# Patient Record
Sex: MALE | Race: WHITE | NOT HISPANIC OR LATINO | Employment: OTHER | URBAN - NONMETROPOLITAN AREA
[De-identification: names, ages, dates, MRNs, and addresses within clinical notes are randomized per-mention and may not be internally consistent; named-entity substitution may affect disease eponyms.]

---

## 2019-09-23 PROCEDURE — 93306 TTE W/DOPPLER COMPLETE: CPT | Performed by: INTERNAL MEDICINE

## 2019-09-24 ENCOUNTER — OUTSIDE FACILITY SERVICE (OUTPATIENT)
Dept: CARDIOLOGY | Facility: CLINIC | Age: 69
End: 2019-09-24

## 2019-09-25 ENCOUNTER — APPOINTMENT (OUTPATIENT)
Dept: GENERAL RADIOLOGY | Facility: HOSPITAL | Age: 69
End: 2019-09-25

## 2019-09-25 ENCOUNTER — HOSPITAL ENCOUNTER (OUTPATIENT)
Facility: HOSPITAL | Age: 69
Discharge: HOME | End: 2019-09-26
Attending: EMERGENCY MEDICINE | Admitting: INTERNAL MEDICINE

## 2019-09-25 DIAGNOSIS — K92.1 MELENA: Primary | ICD-10-CM

## 2019-09-25 DIAGNOSIS — I95.1 ORTHOSTASIS: ICD-10-CM

## 2019-09-25 DIAGNOSIS — R51.9 NONINTRACTABLE HEADACHE, UNSPECIFIED CHRONICITY PATTERN, UNSPECIFIED HEADACHE TYPE: ICD-10-CM

## 2019-09-25 DIAGNOSIS — R55 NEAR SYNCOPE: ICD-10-CM

## 2019-09-25 DIAGNOSIS — D64.9 SYMPTOMATIC ANEMIA: ICD-10-CM

## 2019-09-25 LAB
ABO GROUP BLD: NORMAL
ABO GROUP BLD: NORMAL
ALBUMIN SERPL-MCNC: 3.9 G/DL (ref 3.5–5.2)
ALBUMIN/GLOB SERPL: 1.6 G/DL
ALP SERPL-CCNC: 46 U/L (ref 39–117)
ALT SERPL W P-5'-P-CCNC: 17 U/L (ref 1–41)
ANION GAP SERPL CALCULATED.3IONS-SCNC: 9 MMOL/L (ref 5–15)
AST SERPL-CCNC: 19 U/L (ref 1–40)
BASOPHILS # BLD AUTO: 0.05 10*3/MM3 (ref 0–0.2)
BASOPHILS NFR BLD AUTO: 0.6 % (ref 0–1.5)
BILIRUB SERPL-MCNC: 0.4 MG/DL (ref 0.2–1.2)
BILIRUB UR QL STRIP: NEGATIVE
BLD GP AB SCN SERPL QL: NEGATIVE
BUN BLD-MCNC: 27 MG/DL (ref 8–23)
BUN/CREAT SERPL: 32.1 (ref 7–25)
CALCIUM SPEC-SCNC: 8.3 MG/DL (ref 8.6–10.5)
CHLORIDE SERPL-SCNC: 105 MMOL/L (ref 98–107)
CLARITY UR: CLEAR
CO2 SERPL-SCNC: 27 MMOL/L (ref 22–29)
COLOR UR: YELLOW
CREAT BLD-MCNC: 0.84 MG/DL (ref 0.76–1.27)
D DIMER PPP FEU-MCNC: <0.27 MCGFEU/ML (ref 0–0.56)
DEPRECATED RDW RBC AUTO: 41.8 FL (ref 37–54)
DEVELOPER EXPIRATION DATE: ABNORMAL
DEVELOPER LOT NUMBER: ABNORMAL
EOSINOPHIL # BLD AUTO: 0.14 10*3/MM3 (ref 0–0.4)
EOSINOPHIL NFR BLD AUTO: 1.6 % (ref 0.3–6.2)
ERYTHROCYTE [DISTWIDTH] IN BLOOD BY AUTOMATED COUNT: 12.2 % (ref 12.3–15.4)
EXPIRATION DATE: ABNORMAL
FECAL OCCULT BLOOD SCREEN, POC: POSITIVE
GFR SERPL CREATININE-BSD FRML MDRD: 91 ML/MIN/1.73
GLOBULIN UR ELPH-MCNC: 2.4 GM/DL
GLUCOSE BLD-MCNC: 108 MG/DL (ref 65–99)
GLUCOSE UR STRIP-MCNC: NEGATIVE MG/DL
HCT VFR BLD AUTO: 26.5 % (ref 37.5–51)
HCT VFR BLD AUTO: 33.4 % (ref 37.5–51)
HGB BLD-MCNC: 11.1 G/DL (ref 13–17.7)
HGB BLD-MCNC: 9 G/DL (ref 13–17.7)
HGB UR QL STRIP.AUTO: NEGATIVE
HOLD SPECIMEN: NORMAL
HOLD SPECIMEN: NORMAL
IMM GRANULOCYTES # BLD AUTO: 0.08 10*3/MM3 (ref 0–0.05)
IMM GRANULOCYTES NFR BLD AUTO: 0.9 % (ref 0–0.5)
KETONES UR QL STRIP: NEGATIVE
LEUKOCYTE ESTERASE UR QL STRIP.AUTO: NEGATIVE
LYMPHOCYTES # BLD AUTO: 2.37 10*3/MM3 (ref 0.7–3.1)
LYMPHOCYTES NFR BLD AUTO: 26.3 % (ref 19.6–45.3)
Lab: ABNORMAL
MAGNESIUM SERPL-MCNC: 2 MG/DL (ref 1.6–2.4)
MCH RBC QN AUTO: 31.6 PG (ref 26.6–33)
MCHC RBC AUTO-ENTMCNC: 33.2 G/DL (ref 31.5–35.7)
MCV RBC AUTO: 95.2 FL (ref 79–97)
MONOCYTES # BLD AUTO: 0.49 10*3/MM3 (ref 0.1–0.9)
MONOCYTES NFR BLD AUTO: 5.4 % (ref 5–12)
NEGATIVE CONTROL: NEGATIVE
NEUTROPHILS # BLD AUTO: 5.88 10*3/MM3 (ref 1.7–7)
NEUTROPHILS NFR BLD AUTO: 65.2 % (ref 42.7–76)
NITRITE UR QL STRIP: NEGATIVE
NRBC BLD AUTO-RTO: 0 /100 WBC (ref 0–0.2)
PH UR STRIP.AUTO: 6.5 [PH] (ref 5–8)
PLATELET # BLD AUTO: 231 10*3/MM3 (ref 140–450)
PMV BLD AUTO: 9.3 FL (ref 6–12)
POSITIVE CONTROL: POSITIVE
POTASSIUM BLD-SCNC: 4.1 MMOL/L (ref 3.5–5.2)
PROT SERPL-MCNC: 6.3 G/DL (ref 6–8.5)
PROT UR QL STRIP: NEGATIVE
RBC # BLD AUTO: 3.51 10*6/MM3 (ref 4.14–5.8)
RH BLD: NEGATIVE
RH BLD: NEGATIVE
SODIUM BLD-SCNC: 141 MMOL/L (ref 136–145)
SP GR UR STRIP: 1.02 (ref 1–1.03)
T&S EXPIRATION DATE: NORMAL
TROPONIN T SERPL-MCNC: <0.01 NG/ML (ref 0–0.03)
UROBILINOGEN UR QL STRIP: NORMAL
WBC NRBC COR # BLD: 9.01 10*3/MM3 (ref 3.4–10.8)
WHOLE BLOOD HOLD SPECIMEN: NORMAL
WHOLE BLOOD HOLD SPECIMEN: NORMAL

## 2019-09-25 PROCEDURE — 86900 BLOOD TYPING SEROLOGIC ABO: CPT

## 2019-09-25 PROCEDURE — G0378 HOSPITAL OBSERVATION PER HR: HCPCS

## 2019-09-25 PROCEDURE — 93005 ELECTROCARDIOGRAM TRACING: CPT

## 2019-09-25 PROCEDURE — 86850 RBC ANTIBODY SCREEN: CPT | Performed by: INTERNAL MEDICINE

## 2019-09-25 PROCEDURE — 86900 BLOOD TYPING SEROLOGIC ABO: CPT | Performed by: INTERNAL MEDICINE

## 2019-09-25 PROCEDURE — 85025 COMPLETE CBC W/AUTO DIFF WBC: CPT

## 2019-09-25 PROCEDURE — 82270 OCCULT BLOOD FECES: CPT | Performed by: PHYSICIAN ASSISTANT

## 2019-09-25 PROCEDURE — 85379 FIBRIN DEGRADATION QUANT: CPT | Performed by: PHYSICIAN ASSISTANT

## 2019-09-25 PROCEDURE — 63710000001 ATORVASTATIN 10 MG TABLET: Performed by: INTERNAL MEDICINE

## 2019-09-25 PROCEDURE — A9270 NON-COVERED ITEM OR SERVICE: HCPCS | Performed by: INTERNAL MEDICINE

## 2019-09-25 PROCEDURE — 83735 ASSAY OF MAGNESIUM: CPT | Performed by: EMERGENCY MEDICINE

## 2019-09-25 PROCEDURE — 99220 PR INITIAL OBSERVATION CARE/DAY 70 MINUTES: CPT | Performed by: INTERNAL MEDICINE

## 2019-09-25 PROCEDURE — 86901 BLOOD TYPING SEROLOGIC RH(D): CPT | Performed by: INTERNAL MEDICINE

## 2019-09-25 PROCEDURE — 86901 BLOOD TYPING SEROLOGIC RH(D): CPT

## 2019-09-25 PROCEDURE — 80053 COMPREHEN METABOLIC PANEL: CPT | Performed by: EMERGENCY MEDICINE

## 2019-09-25 PROCEDURE — 96366 THER/PROPH/DIAG IV INF ADDON: CPT

## 2019-09-25 PROCEDURE — 96365 THER/PROPH/DIAG IV INF INIT: CPT

## 2019-09-25 PROCEDURE — 99285 EMERGENCY DEPT VISIT HI MDM: CPT

## 2019-09-25 PROCEDURE — 81003 URINALYSIS AUTO W/O SCOPE: CPT | Performed by: EMERGENCY MEDICINE

## 2019-09-25 PROCEDURE — 85014 HEMATOCRIT: CPT | Performed by: INTERNAL MEDICINE

## 2019-09-25 PROCEDURE — 96375 TX/PRO/DX INJ NEW DRUG ADDON: CPT

## 2019-09-25 PROCEDURE — 85018 HEMOGLOBIN: CPT | Performed by: INTERNAL MEDICINE

## 2019-09-25 PROCEDURE — 63710000001 ACETAMINOPHEN 325 MG TABLET: Performed by: INTERNAL MEDICINE

## 2019-09-25 PROCEDURE — 63710000001 HYDROCODONE-ACETAMINOPHEN 5-325 MG TABLET: Performed by: INTERNAL MEDICINE

## 2019-09-25 PROCEDURE — 84484 ASSAY OF TROPONIN QUANT: CPT | Performed by: EMERGENCY MEDICINE

## 2019-09-25 PROCEDURE — 93005 ELECTROCARDIOGRAM TRACING: CPT | Performed by: EMERGENCY MEDICINE

## 2019-09-25 PROCEDURE — 71045 X-RAY EXAM CHEST 1 VIEW: CPT

## 2019-09-25 RX ORDER — SODIUM CHLORIDE 9 MG/ML
100 INJECTION, SOLUTION INTRAVENOUS CONTINUOUS
Status: DISCONTINUED | OUTPATIENT
Start: 2019-09-25 | End: 2019-09-26 | Stop reason: HOSPADM

## 2019-09-25 RX ORDER — SODIUM CHLORIDE 0.9 % (FLUSH) 0.9 %
10 SYRINGE (ML) INJECTION EVERY 12 HOURS SCHEDULED
Status: DISCONTINUED | OUTPATIENT
Start: 2019-09-25 | End: 2019-09-26 | Stop reason: HOSPADM

## 2019-09-25 RX ORDER — ONDANSETRON 2 MG/ML
4 INJECTION INTRAMUSCULAR; INTRAVENOUS EVERY 6 HOURS PRN
Status: DISCONTINUED | OUTPATIENT
Start: 2019-09-25 | End: 2019-09-26 | Stop reason: HOSPADM

## 2019-09-25 RX ORDER — ACETAMINOPHEN 650 MG/1
650 SUPPOSITORY RECTAL EVERY 4 HOURS PRN
Status: DISCONTINUED | OUTPATIENT
Start: 2019-09-25 | End: 2019-09-26 | Stop reason: HOSPADM

## 2019-09-25 RX ORDER — PANTOPRAZOLE SODIUM 40 MG/10ML
40 INJECTION, POWDER, LYOPHILIZED, FOR SOLUTION INTRAVENOUS
Status: DISCONTINUED | OUTPATIENT
Start: 2019-09-26 | End: 2019-09-26 | Stop reason: HOSPADM

## 2019-09-25 RX ORDER — ATORVASTATIN CALCIUM 10 MG/1
10 TABLET, FILM COATED ORAL DAILY
Status: DISCONTINUED | OUTPATIENT
Start: 2019-09-25 | End: 2019-09-26 | Stop reason: HOSPADM

## 2019-09-25 RX ORDER — SIMVASTATIN 10 MG
10 TABLET ORAL NIGHTLY
COMMUNITY

## 2019-09-25 RX ORDER — ACETAMINOPHEN 325 MG/1
650 TABLET ORAL EVERY 4 HOURS PRN
Status: DISCONTINUED | OUTPATIENT
Start: 2019-09-25 | End: 2019-09-26 | Stop reason: HOSPADM

## 2019-09-25 RX ORDER — CALCIUM CARBONATE 200(500)MG
2 TABLET,CHEWABLE ORAL 2 TIMES DAILY PRN
Status: DISCONTINUED | OUTPATIENT
Start: 2019-09-25 | End: 2019-09-26 | Stop reason: HOSPADM

## 2019-09-25 RX ORDER — SODIUM CHLORIDE 0.9 % (FLUSH) 0.9 %
10 SYRINGE (ML) INJECTION AS NEEDED
Status: DISCONTINUED | OUTPATIENT
Start: 2019-09-25 | End: 2019-09-26 | Stop reason: HOSPADM

## 2019-09-25 RX ORDER — ERGOCALCIFEROL (VITAMIN D2) 10 MCG
400 TABLET ORAL DAILY
COMMUNITY

## 2019-09-25 RX ORDER — ACETAMINOPHEN 160 MG/5ML
650 SOLUTION ORAL EVERY 4 HOURS PRN
Status: DISCONTINUED | OUTPATIENT
Start: 2019-09-25 | End: 2019-09-26 | Stop reason: HOSPADM

## 2019-09-25 RX ORDER — HYDROCODONE BITARTRATE AND ACETAMINOPHEN 5; 325 MG/1; MG/1
1 TABLET ORAL EVERY 4 HOURS PRN
Status: DISCONTINUED | OUTPATIENT
Start: 2019-09-25 | End: 2019-09-26 | Stop reason: HOSPADM

## 2019-09-25 RX ORDER — PANTOPRAZOLE SODIUM 40 MG/10ML
80 INJECTION, POWDER, LYOPHILIZED, FOR SOLUTION INTRAVENOUS ONCE
Status: COMPLETED | OUTPATIENT
Start: 2019-09-25 | End: 2019-09-25

## 2019-09-25 RX ORDER — CHLORAL HYDRATE 500 MG
1000 CAPSULE ORAL
COMMUNITY
End: 2023-02-16

## 2019-09-25 RX ORDER — ONDANSETRON 4 MG/1
4 TABLET, FILM COATED ORAL EVERY 6 HOURS PRN
Status: DISCONTINUED | OUTPATIENT
Start: 2019-09-25 | End: 2019-09-26 | Stop reason: HOSPADM

## 2019-09-25 RX ADMIN — ACETAMINOPHEN 650 MG: 325 TABLET ORAL at 16:07

## 2019-09-25 RX ADMIN — SODIUM CHLORIDE 1000 ML: 9 INJECTION, SOLUTION INTRAVENOUS at 13:25

## 2019-09-25 RX ADMIN — PANTOPRAZOLE SODIUM 8 MG/HR: 40 INJECTION, POWDER, FOR SOLUTION INTRAVENOUS at 13:54

## 2019-09-25 RX ADMIN — ATORVASTATIN CALCIUM 10 MG: 10 TABLET, FILM COATED ORAL at 17:18

## 2019-09-25 RX ADMIN — SODIUM CHLORIDE, PRESERVATIVE FREE 10 ML: 5 INJECTION INTRAVENOUS at 20:53

## 2019-09-25 RX ADMIN — SODIUM CHLORIDE 100 ML/HR: 9 INJECTION, SOLUTION INTRAVENOUS at 17:11

## 2019-09-25 RX ADMIN — HYDROCODONE BITARTRATE AND ACETAMINOPHEN 1 TABLET: 5; 325 TABLET ORAL at 20:52

## 2019-09-25 RX ADMIN — PANTOPRAZOLE SODIUM 80 MG: 40 INJECTION, POWDER, FOR SOLUTION INTRAVENOUS at 13:53

## 2019-09-26 ENCOUNTER — ANESTHESIA (OUTPATIENT)
Dept: GASTROENTEROLOGY | Facility: HOSPITAL | Age: 69
End: 2019-09-26

## 2019-09-26 ENCOUNTER — ANESTHESIA EVENT (OUTPATIENT)
Dept: GASTROENTEROLOGY | Facility: HOSPITAL | Age: 69
End: 2019-09-26

## 2019-09-26 VITALS
RESPIRATION RATE: 16 BRPM | SYSTOLIC BLOOD PRESSURE: 125 MMHG | BODY MASS INDEX: 24.11 KG/M2 | HEIGHT: 72 IN | OXYGEN SATURATION: 99 % | WEIGHT: 178 LBS | TEMPERATURE: 97 F | DIASTOLIC BLOOD PRESSURE: 70 MMHG | HEART RATE: 67 BPM

## 2019-09-26 PROBLEM — D62 ACUTE BLOOD LOSS ANEMIA: Status: ACTIVE | Noted: 2019-09-26

## 2019-09-26 PROBLEM — K92.2 GI BLEED: Status: ACTIVE | Noted: 2019-09-26

## 2019-09-26 PROBLEM — K27.9 PEPTIC ULCER DISEASE: Status: ACTIVE | Noted: 2019-09-26

## 2019-09-26 LAB
ANION GAP SERPL CALCULATED.3IONS-SCNC: 8 MMOL/L (ref 5–15)
BUN BLD-MCNC: 18 MG/DL (ref 8–23)
BUN/CREAT SERPL: 23.4 (ref 7–25)
CALCIUM SPEC-SCNC: 7.9 MG/DL (ref 8.6–10.5)
CHLORIDE SERPL-SCNC: 109 MMOL/L (ref 98–107)
CHOLEST SERPL-MCNC: 115 MG/DL (ref 0–200)
CO2 SERPL-SCNC: 26 MMOL/L (ref 22–29)
CREAT BLD-MCNC: 0.77 MG/DL (ref 0.76–1.27)
DEPRECATED RDW RBC AUTO: 44.1 FL (ref 37–54)
ERYTHROCYTE [DISTWIDTH] IN BLOOD BY AUTOMATED COUNT: 12.5 % (ref 12.3–15.4)
FERRITIN SERPL-MCNC: 46.91 NG/ML (ref 30–400)
GFR SERPL CREATININE-BSD FRML MDRD: 101 ML/MIN/1.73
GLUCOSE BLD-MCNC: 89 MG/DL (ref 65–99)
HBA1C MFR BLD: 4.9 % (ref 4.8–5.6)
HCT VFR BLD AUTO: 29.4 % (ref 37.5–51)
HCT VFR BLD AUTO: 29.4 % (ref 37.5–51)
HDLC SERPL-MCNC: 34 MG/DL (ref 40–60)
HGB BLD-MCNC: 9.7 G/DL (ref 13–17.7)
HGB BLD-MCNC: 9.7 G/DL (ref 13–17.7)
IRON 24H UR-MRATE: 49 MCG/DL (ref 59–158)
IRON SATN MFR SERPL: 17 % (ref 20–50)
LDLC SERPL CALC-MCNC: 68 MG/DL (ref 0–100)
LDLC/HDLC SERPL: 1.99 {RATIO}
MCH RBC QN AUTO: 32.1 PG (ref 26.6–33)
MCHC RBC AUTO-ENTMCNC: 33 G/DL (ref 31.5–35.7)
MCV RBC AUTO: 97.4 FL (ref 79–97)
PLATELET # BLD AUTO: 191 10*3/MM3 (ref 140–450)
PMV BLD AUTO: 9.7 FL (ref 6–12)
POTASSIUM BLD-SCNC: 4 MMOL/L (ref 3.5–5.2)
RBC # BLD AUTO: 3.02 10*6/MM3 (ref 4.14–5.8)
SODIUM BLD-SCNC: 143 MMOL/L (ref 136–145)
TIBC SERPL-MCNC: 292 MCG/DL (ref 298–536)
TRANSFERRIN SERPL-MCNC: 196 MG/DL (ref 200–360)
TRIGL SERPL-MCNC: 66 MG/DL (ref 0–150)
TSH SERPL DL<=0.05 MIU/L-ACNC: 6.12 UIU/ML (ref 0.27–4.2)
VLDLC SERPL-MCNC: 13.2 MG/DL
WBC NRBC COR # BLD: 5.98 10*3/MM3 (ref 3.4–10.8)

## 2019-09-26 PROCEDURE — 84466 ASSAY OF TRANSFERRIN: CPT | Performed by: NURSE PRACTITIONER

## 2019-09-26 PROCEDURE — 80048 BASIC METABOLIC PNL TOTAL CA: CPT | Performed by: INTERNAL MEDICINE

## 2019-09-26 PROCEDURE — 96375 TX/PRO/DX INJ NEW DRUG ADDON: CPT

## 2019-09-26 PROCEDURE — 97161 PT EVAL LOW COMPLEX 20 MIN: CPT

## 2019-09-26 PROCEDURE — A9270 NON-COVERED ITEM OR SERVICE: HCPCS | Performed by: NURSE PRACTITIONER

## 2019-09-26 PROCEDURE — 85027 COMPLETE CBC AUTOMATED: CPT | Performed by: INTERNAL MEDICINE

## 2019-09-26 PROCEDURE — A9270 NON-COVERED ITEM OR SERVICE: HCPCS | Performed by: INTERNAL MEDICINE

## 2019-09-26 PROCEDURE — 84443 ASSAY THYROID STIM HORMONE: CPT | Performed by: INTERNAL MEDICINE

## 2019-09-26 PROCEDURE — 25010000002 PROPOFOL 10 MG/ML EMULSION: Performed by: NURSE ANESTHETIST, CERTIFIED REGISTERED

## 2019-09-26 PROCEDURE — 25010000002 METOCLOPRAMIDE PER 10 MG: Performed by: INTERNAL MEDICINE

## 2019-09-26 PROCEDURE — 63710000001 BUTALBITAL-ACETAMINOPHEN-CAFFEINE 50-325-40 MG TABLET: Performed by: NURSE PRACTITIONER

## 2019-09-26 PROCEDURE — 85018 HEMOGLOBIN: CPT | Performed by: INTERNAL MEDICINE

## 2019-09-26 PROCEDURE — 85014 HEMATOCRIT: CPT | Performed by: INTERNAL MEDICINE

## 2019-09-26 PROCEDURE — 63710000001 ATORVASTATIN 10 MG TABLET: Performed by: INTERNAL MEDICINE

## 2019-09-26 PROCEDURE — 99204 OFFICE O/P NEW MOD 45 MIN: CPT | Performed by: NURSE PRACTITIONER

## 2019-09-26 PROCEDURE — 83540 ASSAY OF IRON: CPT | Performed by: NURSE PRACTITIONER

## 2019-09-26 PROCEDURE — 63710000001 HYDROCODONE-ACETAMINOPHEN 5-325 MG TABLET: Performed by: INTERNAL MEDICINE

## 2019-09-26 PROCEDURE — 96376 TX/PRO/DX INJ SAME DRUG ADON: CPT

## 2019-09-26 PROCEDURE — 82728 ASSAY OF FERRITIN: CPT | Performed by: NURSE PRACTITIONER

## 2019-09-26 PROCEDURE — 83036 HEMOGLOBIN GLYCOSYLATED A1C: CPT | Performed by: INTERNAL MEDICINE

## 2019-09-26 PROCEDURE — G0378 HOSPITAL OBSERVATION PER HR: HCPCS

## 2019-09-26 PROCEDURE — 88305 TISSUE EXAM BY PATHOLOGIST: CPT | Performed by: INTERNAL MEDICINE

## 2019-09-26 PROCEDURE — 99217 PR OBSERVATION CARE DISCHARGE MANAGEMENT: CPT | Performed by: INTERNAL MEDICINE

## 2019-09-26 PROCEDURE — 25010000002 PROMETHAZINE PER 50 MG: Performed by: INTERNAL MEDICINE

## 2019-09-26 PROCEDURE — 63710000001 ONDANSETRON PER 8 MG: Performed by: INTERNAL MEDICINE

## 2019-09-26 PROCEDURE — 88342 IMHCHEM/IMCYTCHM 1ST ANTB: CPT | Performed by: INTERNAL MEDICINE

## 2019-09-26 PROCEDURE — 80061 LIPID PANEL: CPT | Performed by: INTERNAL MEDICINE

## 2019-09-26 RX ORDER — FENTANYL CITRATE 50 UG/ML
50 INJECTION, SOLUTION INTRAMUSCULAR; INTRAVENOUS
Status: DISCONTINUED | OUTPATIENT
Start: 2019-09-26 | End: 2019-09-26 | Stop reason: HOSPADM

## 2019-09-26 RX ORDER — PANTOPRAZOLE SODIUM 40 MG/1
40 TABLET, DELAYED RELEASE ORAL 2 TIMES DAILY
Qty: 60 TABLET | Refills: 0 | Status: CANCELLED | OUTPATIENT
Start: 2019-09-26

## 2019-09-26 RX ORDER — SODIUM CHLORIDE, SODIUM LACTATE, POTASSIUM CHLORIDE, CALCIUM CHLORIDE 600; 310; 30; 20 MG/100ML; MG/100ML; MG/100ML; MG/100ML
20 INJECTION, SOLUTION INTRAVENOUS CONTINUOUS
Status: DISCONTINUED | OUTPATIENT
Start: 2019-09-26 | End: 2019-09-26 | Stop reason: HOSPADM

## 2019-09-26 RX ORDER — ONDANSETRON 2 MG/ML
4 INJECTION INTRAMUSCULAR; INTRAVENOUS ONCE AS NEEDED
Status: DISCONTINUED | OUTPATIENT
Start: 2019-09-26 | End: 2019-09-26 | Stop reason: HOSPADM

## 2019-09-26 RX ORDER — PROMETHAZINE HYDROCHLORIDE 25 MG/1
25 TABLET ORAL ONCE AS NEEDED
Status: DISCONTINUED | OUTPATIENT
Start: 2019-09-26 | End: 2019-09-26 | Stop reason: HOSPADM

## 2019-09-26 RX ORDER — PANTOPRAZOLE SODIUM 40 MG/1
40 TABLET, DELAYED RELEASE ORAL 2 TIMES DAILY
Qty: 60 TABLET | Refills: 0 | Status: SHIPPED | OUTPATIENT
Start: 2019-09-26 | End: 2022-06-16

## 2019-09-26 RX ORDER — BUTALBITAL, ACETAMINOPHEN AND CAFFEINE 50; 325; 40 MG/1; MG/1; MG/1
2 TABLET ORAL EVERY 4 HOURS PRN
Status: DISCONTINUED | OUTPATIENT
Start: 2019-09-26 | End: 2019-09-26 | Stop reason: HOSPADM

## 2019-09-26 RX ORDER — METOCLOPRAMIDE HYDROCHLORIDE 5 MG/ML
10 INJECTION INTRAMUSCULAR; INTRAVENOUS ONCE
Status: COMPLETED | OUTPATIENT
Start: 2019-09-26 | End: 2019-09-26

## 2019-09-26 RX ORDER — PROMETHAZINE HYDROCHLORIDE 25 MG/ML
6.25 INJECTION, SOLUTION INTRAMUSCULAR; INTRAVENOUS ONCE AS NEEDED
Status: DISCONTINUED | OUTPATIENT
Start: 2019-09-26 | End: 2019-09-26 | Stop reason: HOSPADM

## 2019-09-26 RX ORDER — BUTALBITAL, ACETAMINOPHEN AND CAFFEINE 50; 325; 40 MG/1; MG/1; MG/1
2 TABLET ORAL EVERY 4 HOURS PRN
Qty: 20 TABLET | Refills: 0 | Status: CANCELLED | OUTPATIENT
Start: 2019-09-26

## 2019-09-26 RX ORDER — PROPOFOL 10 MG/ML
VIAL (ML) INTRAVENOUS AS NEEDED
Status: DISCONTINUED | OUTPATIENT
Start: 2019-09-26 | End: 2019-09-26 | Stop reason: SURG

## 2019-09-26 RX ORDER — BUTALBITAL, ACETAMINOPHEN AND CAFFEINE 50; 325; 40 MG/1; MG/1; MG/1
2 TABLET ORAL EVERY 4 HOURS PRN
Qty: 20 TABLET | Refills: 0 | Status: SHIPPED | OUTPATIENT
Start: 2019-09-26 | End: 2022-06-16

## 2019-09-26 RX ORDER — PROMETHAZINE HYDROCHLORIDE 25 MG/ML
25 INJECTION, SOLUTION INTRAMUSCULAR; INTRAVENOUS ONCE
Status: COMPLETED | OUTPATIENT
Start: 2019-09-26 | End: 2019-09-26

## 2019-09-26 RX ORDER — PROMETHAZINE HYDROCHLORIDE 25 MG/1
25 SUPPOSITORY RECTAL ONCE AS NEEDED
Status: DISCONTINUED | OUTPATIENT
Start: 2019-09-26 | End: 2019-09-26 | Stop reason: HOSPADM

## 2019-09-26 RX ORDER — LIDOCAINE HYDROCHLORIDE 10 MG/ML
INJECTION, SOLUTION EPIDURAL; INFILTRATION; INTRACAUDAL; PERINEURAL AS NEEDED
Status: DISCONTINUED | OUTPATIENT
Start: 2019-09-26 | End: 2019-09-26 | Stop reason: SURG

## 2019-09-26 RX ORDER — BUTALBITAL, ACETAMINOPHEN AND CAFFEINE 50; 325; 40 MG/1; MG/1; MG/1
2 TABLET ORAL EVERY 4 HOURS PRN
Status: CANCELLED | OUTPATIENT
Start: 2019-09-26

## 2019-09-26 RX ADMIN — HYDROCODONE BITARTRATE AND ACETAMINOPHEN 1 TABLET: 5; 325 TABLET ORAL at 02:48

## 2019-09-26 RX ADMIN — PROPOFOL 50 MG: 10 INJECTION, EMULSION INTRAVENOUS at 13:02

## 2019-09-26 RX ADMIN — PROMETHAZINE HYDROCHLORIDE 25 MG: 25 INJECTION INTRAMUSCULAR; INTRAVENOUS at 07:35

## 2019-09-26 RX ADMIN — ONDANSETRON HYDROCHLORIDE 4 MG: 4 TABLET, FILM COATED ORAL at 02:48

## 2019-09-26 RX ADMIN — ATORVASTATIN CALCIUM 10 MG: 10 TABLET, FILM COATED ORAL at 10:17

## 2019-09-26 RX ADMIN — LIDOCAINE HYDROCHLORIDE 50 MG: 10 INJECTION, SOLUTION EPIDURAL; INFILTRATION; INTRACAUDAL; PERINEURAL at 12:58

## 2019-09-26 RX ADMIN — SODIUM CHLORIDE 100 ML/HR: 9 INJECTION, SOLUTION INTRAVENOUS at 03:57

## 2019-09-26 RX ADMIN — BUTALBITAL, ACETAMINOPHEN AND CAFFEINE 2 TABLET: 50; 325; 40 TABLET ORAL at 15:47

## 2019-09-26 RX ADMIN — METOCLOPRAMIDE 10 MG: 5 INJECTION, SOLUTION INTRAMUSCULAR; INTRAVENOUS at 07:35

## 2019-09-26 RX ADMIN — PROPOFOL 150 MG: 10 INJECTION, EMULSION INTRAVENOUS at 12:58

## 2019-09-26 RX ADMIN — SODIUM CHLORIDE, POTASSIUM CHLORIDE, SODIUM LACTATE AND CALCIUM CHLORIDE 20 ML/HR: 600; 310; 30; 20 INJECTION, SOLUTION INTRAVENOUS at 11:04

## 2019-09-26 RX ADMIN — PANTOPRAZOLE SODIUM 40 MG: 40 INJECTION, POWDER, FOR SOLUTION INTRAVENOUS at 06:50

## 2019-09-26 RX ADMIN — BUTALBITAL, ACETAMINOPHEN AND CAFFEINE 2 TABLET: 50; 325; 40 TABLET ORAL at 10:17

## 2019-09-26 NOTE — ANESTHESIA PREPROCEDURE EVALUATION
Anesthesia Evaluation     Patient summary reviewed and Nursing notes reviewed   NPO Solid Status: > 8 hours  NPO Liquid Status: > 8 hours           Airway   Mallampati: II  TM distance: >3 FB  Neck ROM: full  No difficulty expected  Dental      Pulmonary    (-) COPD, asthma, shortness of breath, recent URI, not a smoker  Cardiovascular     ECG reviewed    (+) CAD (mild CAD on CATH 20Yrs ago ), hyperlipidemia,     ROS comment: EKG Normal sinus rhythm  Nonspecific T wave abnormality    Neuro/Psych  (-) seizures, CVA, dizziness/light headedness  GI/Hepatic/Renal/Endo    (-) liver disease, no renal disease, diabetes, hypothyroidism    Musculoskeletal     Abdominal    Substance History      OB/GYN          Other        ROS/Med Hx Other: Admitted yesterday with dizzyspells and hypotension w anemia HCT 29                   Anesthesia Plan    ASA 3     MAC   (PFL   Still low BP 90's  -may need pressors )  intravenous induction   Anesthetic plan, all risks, benefits, and alternatives have been provided, discussed and informed consent has been obtained with: patient.    Plan discussed with CRNA.

## 2019-09-30 LAB
CYTO UR: NORMAL
LAB AP CASE REPORT: NORMAL
LAB AP CLINICAL INFORMATION: NORMAL
PATH REPORT.FINAL DX SPEC: NORMAL
PATH REPORT.GROSS SPEC: NORMAL

## 2019-10-28 ENCOUNTER — OFFICE VISIT (OUTPATIENT)
Dept: NEUROLOGY | Facility: CLINIC | Age: 69
End: 2019-10-28

## 2019-10-28 ENCOUNTER — TELEPHONE (OUTPATIENT)
Dept: NEUROLOGY | Facility: CLINIC | Age: 69
End: 2019-10-28

## 2019-10-28 VITALS
DIASTOLIC BLOOD PRESSURE: 60 MMHG | SYSTOLIC BLOOD PRESSURE: 110 MMHG | WEIGHT: 182 LBS | HEART RATE: 79 BPM | OXYGEN SATURATION: 99 % | HEIGHT: 72 IN | BODY MASS INDEX: 24.65 KG/M2

## 2019-10-28 DIAGNOSIS — G43.C0 PERIODIC HEADACHE SYNDROME, NOT INTRACTABLE: Primary | ICD-10-CM

## 2019-10-28 PROCEDURE — 99203 OFFICE O/P NEW LOW 30 MIN: CPT | Performed by: PSYCHIATRY & NEUROLOGY

## 2019-10-28 RX ORDER — SUMATRIPTAN 100 MG/1
TABLET, FILM COATED ORAL
Qty: 12 TABLET | Refills: 2 | Status: SHIPPED | OUTPATIENT
Start: 2019-10-28

## 2019-10-28 NOTE — TELEPHONE ENCOUNTER
Pt was seen today, and he states he would like something prescribed to take when he has a headache. Please advise.

## 2019-10-28 NOTE — TELEPHONE ENCOUNTER
----- Message from Melia Peña sent at 10/28/2019  3:12 PM EDT -----  Regarding: HA'S  Contact: [phone]  Patient states he forgot to ask for a medication to help with headaches.    Ibis diehl

## 2019-10-28 NOTE — ASSESSMENT & PLAN NOTE
Headaches are improving with treatment.  Continue current treatment regimen.     Avoid over usage of analgesic medications

## 2019-10-28 NOTE — PROGRESS NOTES
Subjective   Patient ID: Marlon Bergman is a 69 y.o. male     Chief Complaint   Patient presents with   • Migraine        History of Present Illness    69 y.o. male referred by Dr Gomez for migraines.  HA frequency is 4 - 5 days a week.  Taking Excedrin daily.   Located across forehead.  Quality is sharp pain.  Moderate intensity.  HA present for 10 years.      Taking Fioricet and Tylenol 2 - 3 times a week.      Reviewed medical records:  Admitted to [facility] ED 19 - 19 for PUD, GI bleed secondary to NSAID usage.    Past Medical History:   Diagnosis Date   • Elevated cholesterol    • Hyperlipidemia      History reviewed. No pertinent family history.  Social History     Socioeconomic History   • Marital status:      Spouse name: Not on file   • Number of children: Not on file   • Years of education: Not on file   • Highest education level: Not on file   Tobacco Use   • Smoking status: Former Smoker     Years: 10.00     Types: Cigarettes     Last attempt to quit: 1987     Years since quittin.1   Substance and Sexual Activity   • Alcohol use: No     Frequency: Never   • Drug use: No       Review of Systems   Constitutional: Positive for fatigue. Negative for activity change and unexpected weight change.   HENT: Negative for facial swelling, hearing loss, tinnitus, trouble swallowing and voice change.    Eyes: Negative for photophobia, pain and visual disturbance.   Respiratory: Negative for apnea, cough and choking.    Cardiovascular: Negative for chest pain.   Gastrointestinal: Negative for constipation, nausea and vomiting.   Endocrine: Negative for cold intolerance.   Genitourinary: Negative for difficulty urinating, frequency and urgency.   Musculoskeletal: Negative for arthralgias, back pain, gait problem, myalgias, neck pain and neck stiffness.   Skin: Negative for rash.   Allergic/Immunologic: Negative for immunocompromised state.   Neurological: Positive for headaches. Negative for  "dizziness, tremors, seizures, syncope, facial asymmetry, speech difficulty, weakness, light-headedness and numbness.   Hematological: Negative for adenopathy.   Psychiatric/Behavioral: Negative for confusion, decreased concentration, hallucinations and sleep disturbance. The patient is not nervous/anxious.        Objective     Vitals:    10/28/19 1411   BP: 110/60   Pulse: 79   SpO2: 99%   Weight: 82.6 kg (182 lb)   Height: 182.9 cm (72\")       Neurologic Exam     Mental Status   Oriented to person, place, and time.   Registration: recalls 3 of 3 objects. Recall at 5 minutes: recalls 3 of 3 objects. Follows 3 step commands.   Attention: normal. Concentration: normal.   Speech: speech is normal   Level of consciousness: alert  Knowledge: good and consistent with education.   Able to name object. Able to read. Able to repeat. Able to write. Normal comprehension.     Cranial Nerves     CN II   Visual fields full to confrontation.   Visual acuity: normal  Right visual field deficit: none  Left visual field deficit: none     CN III, IV, VI   Pupils are equal, round, and reactive to light.  Extraocular motions are normal.   Right pupil: Shape: regular. Reactivity: brisk. Consensual response: intact.   Left pupil: Shape: regular. Reactivity: brisk. Consensual response: intact.   Nystagmus: none   Diplopia: none  Ophthalmoparesis: none  Upgaze: normal  Downgaze: normal  Conjugate gaze: present  Vestibulo-ocular reflex: present    CN V   Facial sensation intact.   Right corneal reflex: normal  Left corneal reflex: normal    CN VII   Right facial weakness: none  Left facial weakness: none    CN VIII   Hearing: intact    CN IX, X   Palate: symmetric  Right gag reflex: normal  Left gag reflex: normal    CN XI   Right sternocleidomastoid strength: normal  Left sternocleidomastoid strength: normal    CN XII   Tongue: not atrophic  Fasciculations: absent  Tongue deviation: none    Motor Exam   Muscle bulk: normal  Overall muscle " tone: normal  Right arm tone: normal  Left arm tone: normal  Right leg tone: normal  Left leg tone: normal    Strength   Strength 5/5 throughout.     Sensory Exam   Light touch normal.   Vibration normal.   Proprioception normal.   Pinprick normal.     Gait, Coordination, and Reflexes     Gait  Gait: normal    Coordination   Romberg: negative  Finger to nose coordination: normal  Heel to shin coordination: normal  Tandem walking coordination: normal    Tremor   Resting tremor: absent  Intention tremor: absent  Action tremor: absent    Reflexes   Reflexes 2+ except as noted.       Physical Exam   Constitutional: He is oriented to person, place, and time. Vital signs are normal. He appears well-developed and well-nourished.   HENT:   Head: Normocephalic and atraumatic.   Eyes: EOM and lids are normal. Pupils are equal, round, and reactive to light.   Fundoscopic exam:       The right eye shows no exudate, no hemorrhage and no papilledema. The right eye shows venous pulsations.        The left eye shows no exudate, no hemorrhage and no papilledema. The left eye shows venous pulsations.   Neck: Normal range of motion and phonation normal. Normal carotid pulses present. Carotid bruit is not present. No thyroid mass and no thyromegaly present.   Cardiovascular: Normal rate, regular rhythm and normal heart sounds.   Pulmonary/Chest: Effort normal.   Neurological: He is oriented to person, place, and time. He has normal strength. He has a normal Finger-Nose-Finger Test, a normal Heel to Shin Test, a normal Romberg Test and a normal Tandem Gait Test. Gait normal.   Skin: Skin is warm and dry.   Psychiatric: He has a normal mood and affect. His speech is normal.   Nursing note and vitals reviewed.      Admission on 09/25/2019, Discharged on 09/26/2019   Component Date Value Ref Range Status   • Glucose 09/25/2019 108* 65 - 99 mg/dL Final   • BUN 09/25/2019 27* 8 - 23 mg/dL Final   • Creatinine 09/25/2019 0.84  0.76 - 1.27  mg/dL Final   • Sodium 09/25/2019 141  136 - 145 mmol/L Final   • Potassium 09/25/2019 4.1  3.5 - 5.2 mmol/L Final   • Chloride 09/25/2019 105  98 - 107 mmol/L Final   • CO2 09/25/2019 27.0  22.0 - 29.0 mmol/L Final   • Calcium 09/25/2019 8.3* 8.6 - 10.5 mg/dL Final   • Total Protein 09/25/2019 6.3  6.0 - 8.5 g/dL Final   • Albumin 09/25/2019 3.90  3.50 - 5.20 g/dL Final   • ALT (SGPT) 09/25/2019 17  1 - 41 U/L Final   • AST (SGOT) 09/25/2019 19  1 - 40 U/L Final   • Alkaline Phosphatase 09/25/2019 46  39 - 117 U/L Final   • Total Bilirubin 09/25/2019 0.4  0.2 - 1.2 mg/dL Final   • eGFR Non African Amer 09/25/2019 91  >60 mL/min/1.73 Final   • Globulin 09/25/2019 2.4  gm/dL Final   • A/G Ratio 09/25/2019 1.6  g/dL Final   • BUN/Creatinine Ratio 09/25/2019 32.1* 7.0 - 25.0 Final   • Anion Gap 09/25/2019 9.0  5.0 - 15.0 mmol/L Final   • Troponin T 09/25/2019 <0.010  0.000 - 0.030 ng/mL Final   • Magnesium 09/25/2019 2.0  1.6 - 2.4 mg/dL Final   • Color, UA 09/25/2019 Yellow  Yellow, Straw Final   • Appearance, UA 09/25/2019 Clear  Clear Final   • pH, UA 09/25/2019 6.5  5.0 - 8.0 Final   • Specific Gravity, UA 09/25/2019 1.022  1.001 - 1.030 Final   • Glucose, UA 09/25/2019 Negative  Negative Final   • Ketones, UA 09/25/2019 Negative  Negative Final   • Bilirubin, UA 09/25/2019 Negative  Negative Final   • Blood, UA 09/25/2019 Negative  Negative Final   • Protein, UA 09/25/2019 Negative  Negative Final   • Leuk Esterase, UA 09/25/2019 Negative  Negative Final   • Nitrite, UA 09/25/2019 Negative  Negative Final   • Urobilinogen, UA 09/25/2019 0.2 E.U./dL  0.2 - 1.0 E.U./dL Final   • Extra Tube 09/25/2019 hold for add-on   Final    Auto resulted   • Extra Tube 09/25/2019 Hold for add-ons.   Final    Auto resulted.   • Extra Tube 09/25/2019 hold for add-on   Final    Auto resulted   • Extra Tube 09/25/2019 Hold for add-ons.   Final    Auto resulted.   • WBC 09/25/2019 9.01  3.40 - 10.80 10*3/mm3 Final   • RBC 09/25/2019  3.51* 4.14 - 5.80 10*6/mm3 Final   • Hemoglobin 09/25/2019 11.1* 13.0 - 17.7 g/dL Final   • Hematocrit 09/25/2019 33.4* 37.5 - 51.0 % Final   • MCV 09/25/2019 95.2  79.0 - 97.0 fL Final   • MCH 09/25/2019 31.6  26.6 - 33.0 pg Final   • MCHC 09/25/2019 33.2  31.5 - 35.7 g/dL Final   • RDW 09/25/2019 12.2* 12.3 - 15.4 % Final   • RDW-SD 09/25/2019 41.8  37.0 - 54.0 fl Final   • MPV 09/25/2019 9.3  6.0 - 12.0 fL Final   • Platelets 09/25/2019 231  140 - 450 10*3/mm3 Final   • Neutrophil % 09/25/2019 65.2  42.7 - 76.0 % Final   • Lymphocyte % 09/25/2019 26.3  19.6 - 45.3 % Final   • Monocyte % 09/25/2019 5.4  5.0 - 12.0 % Final   • Eosinophil % 09/25/2019 1.6  0.3 - 6.2 % Final   • Basophil % 09/25/2019 0.6  0.0 - 1.5 % Final   • Immature Grans % 09/25/2019 0.9* 0.0 - 0.5 % Final   • Neutrophils, Absolute 09/25/2019 5.88  1.70 - 7.00 10*3/mm3 Final   • Lymphocytes, Absolute 09/25/2019 2.37  0.70 - 3.10 10*3/mm3 Final   • Monocytes, Absolute 09/25/2019 0.49  0.10 - 0.90 10*3/mm3 Final   • Eosinophils, Absolute 09/25/2019 0.14  0.00 - 0.40 10*3/mm3 Final   • Basophils, Absolute 09/25/2019 0.05  0.00 - 0.20 10*3/mm3 Final   • Immature Grans, Absolute 09/25/2019 0.08* 0.00 - 0.05 10*3/mm3 Final   • nRBC 09/25/2019 0.0  0.0 - 0.2 /100 WBC Final   • Fecal Occult Blood 09/25/2019 Positive* Negative Final   • Lot Number 09/25/2019 [id number]   Final   • Expiration Date 09/25/2019 1/22   Final   • DEVELOPER LOT NUMBER 09/25/2019 [id number]   Final   • DEVELOPER EXPIRATION DATE 09/25/2019 6/22   Final   • Positive Control 09/25/2019 Positive  Positive Final   • Negative Control 09/25/2019 Negative  Negative Final   • D-Dimer, Quantitative 09/25/2019 <0.27  0.00 - 0.56 MCGFEU/mL Final   • ABO Type 09/25/2019 O   Final   • RH type 09/25/2019 Negative   Final   • Antibody Screen 09/25/2019 Negative   Final   • T&S Expiration Date 09/25/2019 9/28/2019 11:59:59 PM   Final   • Hemoglobin 09/25/2019 9.0* 13.0 - 17.7 g/dL Final    Result  chucked   • Hematocrit 09/25/2019 26.5* 37.5 - 51.0 % Final   • ABO Type 09/25/2019 O   Final   • RH type 09/25/2019 Negative   Final   • Glucose 09/26/2019 89  65 - 99 mg/dL Final   • BUN 09/26/2019 18  8 - 23 mg/dL Final   • Creatinine 09/26/2019 0.77  0.76 - 1.27 mg/dL Final   • Sodium 09/26/2019 143  136 - 145 mmol/L Final   • Potassium 09/26/2019 4.0  3.5 - 5.2 mmol/L Final   • Chloride 09/26/2019 109* 98 - 107 mmol/L Final   • CO2 09/26/2019 26.0  22.0 - 29.0 mmol/L Final   • Calcium 09/26/2019 7.9* 8.6 - 10.5 mg/dL Final   • eGFR Non African Amer 09/26/2019 101  >60 mL/min/1.73 Final   • BUN/Creatinine Ratio 09/26/2019 23.4  7.0 - 25.0 Final   • Anion Gap 09/26/2019 8.0  5.0 - 15.0 mmol/L Final   • WBC 09/26/2019 5.98  3.40 - 10.80 10*3/mm3 Final   • RBC 09/26/2019 3.02* 4.14 - 5.80 10*6/mm3 Final   • Hemoglobin 09/26/2019 9.7* 13.0 - 17.7 g/dL Final   • Hematocrit 09/26/2019 29.4* 37.5 - 51.0 % Final   • MCV 09/26/2019 97.4* 79.0 - 97.0 fL Final   • MCH 09/26/2019 32.1  26.6 - 33.0 pg Final   • MCHC 09/26/2019 33.0  31.5 - 35.7 g/dL Final   • RDW 09/26/2019 12.5  12.3 - 15.4 % Final   • RDW-SD 09/26/2019 44.1  37.0 - 54.0 fl Final   • MPV 09/26/2019 9.7  6.0 - 12.0 fL Final   • Platelets 09/26/2019 191  140 - 450 10*3/mm3 Final   • Hemoglobin A1C 09/26/2019 4.90  4.80 - 5.60 % Final   • Total Cholesterol 09/26/2019 115  0 - 200 mg/dL Final   • Triglycerides 09/26/2019 66  0 - 150 mg/dL Final   • HDL Cholesterol 09/26/2019 34* 40 - 60 mg/dL Final   • LDL Cholesterol  09/26/2019 68  0 - 100 mg/dL Final   • VLDL Cholesterol 09/26/2019 13.2  mg/dL Final   • LDL/HDL Ratio 09/26/2019 1.99   Final   • TSH 09/26/2019 6.120* 0.270 - 4.200 uIU/mL Final   • Hemoglobin 09/26/2019 9.7* 13.0 - 17.7 g/dL Final   • Hematocrit 09/26/2019 29.4* 37.5 - 51.0 % Final   • Iron 09/26/2019 49* 59 - 158 mcg/dL Final   • Iron Saturation 09/26/2019 17* 20 - 50 % Final   • Transferrin 09/26/2019 196* 200 - 360 mg/dL Final   • TIBC  09/26/2019 292* 298 - 536 mcg/dL Final   • Ferritin 09/26/2019 46.91  30.00 - 400.00 ng/mL Final   • Case Report 09/26/2019    Final                    Value:Surgical Pathology Report                         Case: [id number]                                  Authorizing Provider:  Brunner, Mark I, MD        Collected:           09/26/2019 01:03 PM          Ordering Location:     [facility]   Received:            09/26/2019 01:36 PM                                 ENDO [facility]                                                                  Pathologist:           Liang Chance MD                                                        Specimen:    Gastric, Antrum, antrum bx                                                                • Clinical Information 09/26/2019    Final                    Value:This result contains rich text formatting which cannot be displayed here.   • Final Diagnosis 09/26/2019    Final                    Value:This result contains rich text formatting which cannot be displayed here.   • Gross Description 09/26/2019    Final                    Value:This result contains rich text formatting which cannot be displayed here.   • Microscopic Description 09/26/2019    Final                    Value:This result contains rich text formatting which cannot be displayed here.         Assessment/Plan     Problem List Items Addressed This Visit        Cardiovascular and Mediastinum    Periodic headache syndrome, not intractable - Primary    Current Assessment & Plan     Headaches are improving with treatment.  Continue current treatment regimen.     Avoid over usage of analgesic medications             Relevant Medications    butalbital-acetaminophen-caffeine (FIORICET, ESGIC) -40 MG per tablet             No Follow-up on file.

## 2019-11-21 ENCOUNTER — LAB REQUISITION (OUTPATIENT)
Dept: LAB | Facility: HOSPITAL | Age: 69
End: 2019-11-21

## 2019-11-21 ENCOUNTER — OUTSIDE FACILITY SERVICE (OUTPATIENT)
Dept: GASTROENTEROLOGY | Facility: CLINIC | Age: 69
End: 2019-11-21

## 2019-11-21 DIAGNOSIS — K25.7 CHRONIC GASTRIC ULCER WITHOUT HEMORRHAGE OR PERFORATION: ICD-10-CM

## 2019-11-21 PROCEDURE — 88305 TISSUE EXAM BY PATHOLOGIST: CPT | Performed by: INTERNAL MEDICINE

## 2019-11-21 PROCEDURE — 43239 EGD BIOPSY SINGLE/MULTIPLE: CPT | Performed by: INTERNAL MEDICINE

## 2020-10-27 ENCOUNTER — OFFICE VISIT (OUTPATIENT)
Dept: CARDIOLOGY | Facility: CLINIC | Age: 70
End: 2020-10-27

## 2020-10-27 VITALS
HEART RATE: 77 BPM | OXYGEN SATURATION: 99 % | DIASTOLIC BLOOD PRESSURE: 67 MMHG | BODY MASS INDEX: 24.11 KG/M2 | HEIGHT: 72 IN | SYSTOLIC BLOOD PRESSURE: 116 MMHG | WEIGHT: 178 LBS

## 2020-10-27 DIAGNOSIS — R06.02 SHORTNESS OF BREATH: Primary | ICD-10-CM

## 2020-10-27 DIAGNOSIS — R00.1 BRADYCARDIA, SINUS: ICD-10-CM

## 2020-10-27 DIAGNOSIS — R55 SYNCOPE AND COLLAPSE: ICD-10-CM

## 2020-10-27 PROCEDURE — 99204 OFFICE O/P NEW MOD 45 MIN: CPT | Performed by: PHYSICIAN ASSISTANT

## 2020-10-27 RX ORDER — ASPIRIN 81 MG/1
81 TABLET ORAL DAILY
COMMUNITY
End: 2022-06-16

## 2020-10-27 NOTE — PROGRESS NOTES
"Subjective   Marlon Bergman is a 70 y.o. male     Chief Complaint   Patient presents with   • Establish Care   • Loss of Consciousness       HPI    Patient is a 70-year-old male that presents to the office for evaluation.  Patient has been referred in the setting of syncope.  Patient apparently has history of coronary disease having catheterization over a decade ago, in the distant past, which he was told he had very mild or minor \"blockage\" treated with medications.  I currently do not have records.    Patient has been referred in the setting of syncope.  Patient was driving to [place] apparently on a trip.  Patient describes feeling in epigastric uncomfortable sensation while driving.  Patient has history of GI issues in the past.  He apparently was in the hospital for GI blood loss last year and was diagnosed with peptic ulcer disease.  He has done well.  He does not describe any functional dyspepsia or symptoms of indigestion.  However, as he was driving he felt a slight uncomfortable sensation in the epigastric lower chest region.  He then felt as if he was going to pass out.  Patient describes that he hit the brake and was slowing down but eventually lost consciousness.  He did not have any antecedent cardiac symptoms such as chest pain or palpitations.  He did feel the lower chest or upper abdominal discomfort.  Patient does not note any seizure-like activity.  He eventually regained consciousness after a few seconds.    This is the only lighted episode of syncope the patient has had.  Patient went to see primary.  EKG demonstrated sinus bradycardia in the 50s but otherwise he has been referred to have further evaluation    He has no chest pain or pressure.  He can experience dyspnea with higher levels of activity.  If he tries to do exertional activities he can be mildly dyspneic but no progressive shortness of breath.  No PND orthopnea.    He does not palpitate.  He does not describe any further episodes of " syncope.  No presyncope or dizziness.  Otherwise has done well      Current Outpatient Medications   Medication Sig Dispense Refill   • aspirin 81 MG EC tablet Take 81 mg by mouth Daily.     • Multiple Vitamins-Minerals (MULTIVITAL PO) Take  by mouth Daily.     • Omega-3 Fatty Acids (FISH OIL) 1000 MG capsule capsule Take  by mouth Daily With Breakfast.     • Oxymetazoline HCl (NASAL SPRAY NA) into the nostril(s) as directed by provider.     • pantoprazole (PROTONIX) 40 MG EC tablet Take 1 tablet by mouth 2 (Two) Times a Day. 60 tablet 0   • simvastatin (ZOCOR) 10 MG tablet Take 10 mg by mouth Every Night.     • SUMAtriptan (IMITREX) 100 MG tablet Take one tablet at onset of headache. May repeat dose one time in 2 hours if headache not relieved. 12 tablet 2   • Vitamin D, Cholecalciferol, (CHOLECALCIFEROL) 400 units tablet Take  by mouth Daily.     • butalbital-acetaminophen-caffeine (FIORICET, ESGIC) -40 MG per tablet Take 2 tablets by mouth Every 4 (Four) Hours As Needed for Headache. 20 tablet 0     No current facility-administered medications for this visit.        Patient has no known allergies.    Past Medical History:   Diagnosis Date   • Anxiety    • AR (allergic rhinitis)    • CAD (coronary artery disease)    • Elevated cholesterol    • GERD (gastroesophageal reflux disease)    • Hyperlipidemia        Social History     Socioeconomic History   • Marital status:      Spouse name: Not on file   • Number of children: Not on file   • Years of education: Not on file   • Highest education level: Not on file   Tobacco Use   • Smoking status: Former Smoker     Packs/day: 1.00     Years: 10.00     Pack years: 10.00     Types: Cigarettes     Quit date: 1987     Years since quittin.1   • Smokeless tobacco: Never Used   Substance and Sexual Activity   • Alcohol use: No     Frequency: Never   • Drug use: Never   • Sexual activity: Defer       Family History   Problem Relation Age of Onset   •  "Alzheimer's disease Mother    • Hypertension Mother    • Alzheimer's disease Father    • Hypertension Father    • Coronary artery disease Father        Review of Systems   Constitutional: Positive for fatigue.   HENT: Positive for rhinorrhea (Seasonal allergies ). Negative for congestion and sore throat.    Eyes: Positive for visual disturbance (Reading glasses).   Respiratory: Positive for shortness of breath.    Cardiovascular: Negative for chest pain, palpitations (Once/year or so, feels \"something\" in chest. Lasts roughly 5 sec. States that he did have a \"feeling\" in his chest prior to syncopal episode) and leg swelling.   Endocrine: Negative.    Musculoskeletal: Positive for arthralgias (Hands) and back pain. Negative for neck pain.   Skin: Negative.    Allergic/Immunologic: Positive for environmental allergies (Seasonal ). Negative for food allergies.   Neurological: Positive for syncope (Had one episode of passing out. States he had no warning before he passed out. States he was driving and felt like he was \"fading.\" States he woke up quickly afterwards. Denies lethargy, numbness, HA afterwards. States he was shakey. No ER. ). Negative for dizziness (None, even w/ position change. Does have a hx of vertigo. ), weakness, light-headedness, numbness and headaches.   Hematological: Bruises/bleeds easily.   Psychiatric/Behavioral: Negative.    All other systems reviewed and are negative.      Objective   Vitals:    10/27/20 1048 10/27/20 1106 10/27/20 1107   BP: 142/83 118/70 116/67   BP Location: Right arm Right arm    Patient Position: Lying Sitting    Cuff Size: Adult     Pulse: 63 61 77   SpO2: 98% 99% 99%   Weight: 80.7 kg (178 lb)     Height: 182.9 cm (72\")        /67   Pulse 77   Ht 182.9 cm (72\")   Wt 80.7 kg (178 lb)   SpO2 99%   BMI 24.14 kg/m²     Lab Results (most recent)     None          Physical Exam  Vitals signs and nursing note reviewed.   Constitutional:       General: He is not in " acute distress.     Appearance: Normal appearance. He is well-developed.   HENT:      Head: Normocephalic and atraumatic.   Eyes:      General: No scleral icterus.        Right eye: No discharge.         Left eye: No discharge.      Conjunctiva/sclera: Conjunctivae normal.   Neck:      Vascular: No carotid bruit.   Cardiovascular:      Rate and Rhythm: Normal rate and regular rhythm.      Heart sounds: Normal heart sounds. No murmur. No friction rub. No gallop.    Pulmonary:      Effort: Pulmonary effort is normal. No respiratory distress.      Breath sounds: Normal breath sounds. No wheezing or rales.   Chest:      Chest wall: No tenderness.   Musculoskeletal:      Right lower leg: No edema.      Left lower leg: No edema.   Skin:     General: Skin is warm and dry.      Coloration: Skin is not pale.      Findings: No erythema or rash.   Neurological:      Mental Status: He is alert and oriented to person, place, and time.      Cranial Nerves: No cranial nerve deficit.   Psychiatric:         Behavior: Behavior normal.         Procedure   Procedures         Assessment/Plan     Problems Addressed this Visit        Cardiovascular and Mediastinum    Syncope and collapse    Relevant Orders    Adult Transthoracic Echo Complete W/ Cont if Necessary Per Protocol    Treadmill Stress Test    Cardiac Event Monitor       Respiratory    Shortness of breath - Primary    Relevant Orders    Adult Transthoracic Echo Complete W/ Cont if Necessary Per Protocol    Treadmill Stress Test    Cardiac Event Monitor      Other Visit Diagnoses     Bradycardia, sinus        Relevant Orders    Adult Transthoracic Echo Complete W/ Cont if Necessary Per Protocol    Treadmill Stress Test    Cardiac Event Monitor      Diagnoses       Codes Comments    Shortness of breath    -  Primary ICD-10-CM: R06.02  ICD-9-CM: 786.05     Syncope and collapse     ICD-10-CM: R55  ICD-9-CM: 780.2     Bradycardia, sinus     ICD-10-CM: R00.1  ICD-9-CM: 427.89            Recommendation  1.  Patient is a 70-year-old male that has been referred to our office in the setting of syncope.  We have discussed potential etiologies of syncope.  Based on his description, I wonder if patient had a neurocardiogenic event.  He had epigastric discomfort and then had a syncopal episode.  He has no prior history of syncope and no description of neurologic abnormalities during the event or even today when we saw the patient.    2.  However, I do feel ruling out a cardiac component would be helpful.  Patient had bradycardia in the 50s which again may be physiologic as he appears to be in decent shape.  We will schedule for regular treadmill stress testing to evaluate rate and rhythm chronotropic response and hopefully rule out ischemia as contributing factor    3.  Echocardiogram to evaluate LV structure, LV function rule out structural heart disease etc.    4.  14-day event monitor will be ordered to rule out any arrhythmic substrate based on patient's symptoms    5.  If all of the above is negative, and patient has no recurrent events, tilt table testing might be reasonable to consider in the future or close clinical monitoring.  For now we will see him back for follow-up after testing.  He is to follow with primary as scheduled           Marlon Bergman  reports that he quit smoking about 33 years ago. His smoking use included cigarettes. He has a 10.00 pack-year smoking history. He has never used smokeless tobacco.     Patient's Body mass index is 24.14 kg/m². BMI is within normal parameters. No follow-up required..    Advance Care Planning   ACP discussion was declined by the patient. Patient has an advance directive (not in EMR), copy requested.         Electronically signed by:

## 2020-10-27 NOTE — PATIENT INSTRUCTIONS

## 2020-11-02 ENCOUNTER — HOSPITAL ENCOUNTER (OUTPATIENT)
Dept: CARDIOLOGY | Facility: HOSPITAL | Age: 70
Discharge: HOME | End: 2020-11-02

## 2020-11-02 DIAGNOSIS — R00.1 BRADYCARDIA, SINUS: ICD-10-CM

## 2020-11-02 DIAGNOSIS — R55 SYNCOPE AND COLLAPSE: ICD-10-CM

## 2020-11-02 DIAGNOSIS — R06.02 SHORTNESS OF BREATH: ICD-10-CM

## 2020-11-02 PROCEDURE — 93017 CV STRESS TEST TRACING ONLY: CPT

## 2020-11-02 PROCEDURE — 93306 TTE W/DOPPLER COMPLETE: CPT | Performed by: INTERNAL MEDICINE

## 2020-11-02 PROCEDURE — 93306 TTE W/DOPPLER COMPLETE: CPT

## 2020-11-02 PROCEDURE — 93018 CV STRESS TEST I&R ONLY: CPT | Performed by: INTERNAL MEDICINE

## 2020-11-14 LAB
BH CV ECHO MEAS - % IVS THICK: -17.1 %
BH CV ECHO MEAS - % LVPW THICK: 16.7 %
BH CV ECHO MEAS - ACS: 2.8 CM
BH CV ECHO MEAS - AO MAX PG: 7 MMHG
BH CV ECHO MEAS - AO MEAN PG: 3 MMHG
BH CV ECHO MEAS - AO ROOT AREA (BSA CORRECTED): 1.7
BH CV ECHO MEAS - AO ROOT AREA: 9.6 CM^2
BH CV ECHO MEAS - AO ROOT DIAM: 3.5 CM
BH CV ECHO MEAS - AO V2 MAX: 132 CM/SEC
BH CV ECHO MEAS - AO V2 MEAN: 82.2 CM/SEC
BH CV ECHO MEAS - AO V2 VTI: 27.9 CM
BH CV ECHO MEAS - BSA(HAYCOCK): 2 M^2
BH CV ECHO MEAS - BSA: 2 M^2
BH CV ECHO MEAS - BZI_BMI: 24.1 KILOGRAMS/M^2
BH CV ECHO MEAS - BZI_METRIC_HEIGHT: 182.9 CM
BH CV ECHO MEAS - BZI_METRIC_WEIGHT: 80.7 KG
BH CV ECHO MEAS - EDV(CUBED): 94.8 ML
BH CV ECHO MEAS - EDV(MOD-SP4): 73.1 ML
BH CV ECHO MEAS - EDV(TEICH): 95.4 ML
BH CV ECHO MEAS - EF(CUBED): 79.5 %
BH CV ECHO MEAS - EF(MOD-SP4): 51.8 %
BH CV ECHO MEAS - EF(TEICH): 71.9 %
BH CV ECHO MEAS - ESV(CUBED): 19.5 ML
BH CV ECHO MEAS - ESV(MOD-SP4): 35.2 ML
BH CV ECHO MEAS - ESV(TEICH): 26.8 ML
BH CV ECHO MEAS - FS: 41 %
BH CV ECHO MEAS - IVS/LVPW: 1.2
BH CV ECHO MEAS - IVSD: 1.3 CM
BH CV ECHO MEAS - IVSS: 1 CM
BH CV ECHO MEAS - LA DIMENSION: 3.1 CM
BH CV ECHO MEAS - LA/AO: 0.89
BH CV ECHO MEAS - LV DIASTOLIC VOL/BSA (35-75): 36.1 ML/M^2
BH CV ECHO MEAS - LV MASS(C)D: 187.4 GRAMS
BH CV ECHO MEAS - LV MASS(C)DI: 92.4 GRAMS/M^2
BH CV ECHO MEAS - LV MASS(C)S: 83.5 GRAMS
BH CV ECHO MEAS - LV MASS(C)SI: 41.2 GRAMS/M^2
BH CV ECHO MEAS - LV SYSTOLIC VOL/BSA (12-30): 17.4 ML/M^2
BH CV ECHO MEAS - LVIDD: 4.6 CM
BH CV ECHO MEAS - LVIDS: 2.7 CM
BH CV ECHO MEAS - LVLD AP4: 5.9 CM
BH CV ECHO MEAS - LVLS AP4: 4.7 CM
BH CV ECHO MEAS - LVOT AREA (M): 4.2 CM^2
BH CV ECHO MEAS - LVOT AREA: 4.2 CM^2
BH CV ECHO MEAS - LVOT DIAM: 2.3 CM
BH CV ECHO MEAS - LVPWD: 1 CM
BH CV ECHO MEAS - LVPWS: 1.2 CM
BH CV ECHO MEAS - MV A MAX VEL: 37.3 CM/SEC
BH CV ECHO MEAS - MV E MAX VEL: 53.6 CM/SEC
BH CV ECHO MEAS - MV E/A: 1.4
BH CV ECHO MEAS - PA ACC TIME: 0.1 SEC
BH CV ECHO MEAS - PA PR(ACCEL): 33.1 MMHG
BH CV ECHO MEAS - RAP SYSTOLE: 10 MMHG
BH CV ECHO MEAS - RVSP: 36 MMHG
BH CV ECHO MEAS - SI(AO): 132.4 ML/M^2
BH CV ECHO MEAS - SI(CUBED): 37.2 ML/M^2
BH CV ECHO MEAS - SI(MOD-SP4): 18.7 ML/M^2
BH CV ECHO MEAS - SI(TEICH): 33.8 ML/M^2
BH CV ECHO MEAS - SV(AO): 268.4 ML
BH CV ECHO MEAS - SV(CUBED): 75.4 ML
BH CV ECHO MEAS - SV(MOD-SP4): 37.9 ML
BH CV ECHO MEAS - SV(TEICH): 68.6 ML
BH CV ECHO MEAS - TR MAX VEL: 255 CM/SEC
BH CV STRESS BP STAGE 1: NORMAL
BH CV STRESS BP STAGE 2: NORMAL
BH CV STRESS BP STAGE 3: NORMAL
BH CV STRESS BP STAGE 4: NORMAL
BH CV STRESS DURATION MIN STAGE 1: 3
BH CV STRESS DURATION MIN STAGE 2: 3
BH CV STRESS DURATION MIN STAGE 3: 3
BH CV STRESS DURATION MIN STAGE 4: 0
BH CV STRESS DURATION SEC STAGE 1: 0
BH CV STRESS DURATION SEC STAGE 2: 0
BH CV STRESS DURATION SEC STAGE 3: 0
BH CV STRESS DURATION SEC STAGE 4: 20
BH CV STRESS GRADE STAGE 1: 10
BH CV STRESS GRADE STAGE 2: 12
BH CV STRESS GRADE STAGE 3: 14
BH CV STRESS GRADE STAGE 4: 16
BH CV STRESS HR STAGE 1: 96
BH CV STRESS HR STAGE 2: 119
BH CV STRESS HR STAGE 3: 128
BH CV STRESS HR STAGE 4: 127
BH CV STRESS METS STAGE 1: 5
BH CV STRESS METS STAGE 2: 7.5
BH CV STRESS METS STAGE 3: 10
BH CV STRESS METS STAGE 4: 13.5
BH CV STRESS PROTOCOL 1: NORMAL
BH CV STRESS RECOVERY BP: NORMAL MMHG
BH CV STRESS RECOVERY HR: 83 BPM
BH CV STRESS SPEED STAGE 1: 1.7
BH CV STRESS SPEED STAGE 2: 2.5
BH CV STRESS SPEED STAGE 3: 3.4
BH CV STRESS SPEED STAGE 4: 4.2
BH CV STRESS STAGE 1: 1
BH CV STRESS STAGE 2: 2
BH CV STRESS STAGE 3: 3
BH CV STRESS STAGE 4: 4
MAXIMAL PREDICTED HEART RATE: 151 BPM
MAXIMAL PREDICTED HEART RATE: 151 BPM
PERCENT MAX PREDICTED HR: 84.11 %
STRESS BASELINE BP: NORMAL MMHG
STRESS BASELINE HR: 78 BPM
STRESS PERCENT HR: 99 %
STRESS POST ESTIMATED WORKLOAD: 10.1 METS
STRESS POST EXERCISE DUR MIN: 9 MIN
STRESS POST EXERCISE DUR SEC: 20 SEC
STRESS POST PEAK BP: NORMAL MMHG
STRESS POST PEAK HR: 127 BPM
STRESS TARGET HR: 128 BPM
STRESS TARGET HR: 128 BPM

## 2020-11-16 ENCOUNTER — OUTSIDE FACILITY SERVICE (OUTPATIENT)
Dept: CARDIOLOGY | Facility: CLINIC | Age: 70
End: 2020-11-16

## 2020-11-16 PROCEDURE — 93228 REMOTE 30 DAY ECG REV/REPORT: CPT | Performed by: INTERNAL MEDICINE

## 2020-11-19 ENCOUNTER — TELEPHONE (OUTPATIENT)
Dept: CARDIOLOGY | Facility: CLINIC | Age: 70
End: 2020-11-19

## 2020-11-19 NOTE — TELEPHONE ENCOUNTER
Spoke with pt to let him know of negative stress results. He verbalized that he would like to know if he is still unable to drive due to passing out. Routing note back to Jacqueline. -AS, PAC    From: Dani Edgar APRN   Sent: 11/17/2020   5:06 PM EST   To: Jacqueline Pantoja LPN     Negative a stress test keep follow-up

## 2020-11-20 NOTE — TELEPHONE ENCOUNTER
Gave message to Elizabeth FRANCIS to address with Stephane when he returns, per CLYDE FLOREZ  .Jacqueline Pantoja LPN

## 2020-12-02 ENCOUNTER — OFFICE VISIT (OUTPATIENT)
Dept: CARDIOLOGY | Facility: CLINIC | Age: 70
End: 2020-12-02

## 2020-12-02 VITALS
HEIGHT: 72 IN | HEART RATE: 77 BPM | WEIGHT: 181.2 LBS | OXYGEN SATURATION: 98 % | SYSTOLIC BLOOD PRESSURE: 108 MMHG | DIASTOLIC BLOOD PRESSURE: 65 MMHG | BODY MASS INDEX: 24.54 KG/M2

## 2020-12-02 DIAGNOSIS — R06.02 SHORTNESS OF BREATH: ICD-10-CM

## 2020-12-02 DIAGNOSIS — R55 SYNCOPE AND COLLAPSE: Primary | ICD-10-CM

## 2020-12-02 DIAGNOSIS — R00.1 BRADYCARDIA, SINUS: ICD-10-CM

## 2020-12-02 DIAGNOSIS — I42.0 CARDIOMYOPATHY, DILATED (HCC): ICD-10-CM

## 2020-12-02 PROCEDURE — 99214 OFFICE O/P EST MOD 30 MIN: CPT | Performed by: PHYSICIAN ASSISTANT

## 2020-12-02 RX ORDER — MULTIVIT WITH MINERALS/LUTEIN
250 TABLET ORAL DAILY
COMMUNITY

## 2020-12-02 NOTE — PROGRESS NOTES
Problem list     Subjective   Marlon Bergman is a 70 y.o. male     Chief Complaint   Patient presents with   • Follow-up     testing fu   Problem list  1.  Syncope  2.  Low risk regular treadmill stress test with patient exercising over 9 minutes on a Ge protocol  3.  Questionable cardiomyopathy  3.1 echocardiogram suggest an EF of 45±5% November 2020  4.  Event monitor November 2020 with no significant arrhythmia noted.  Occasional tachycardia with PVC noted    HPI    Patient is a 70-year-old male that presents to the office for evaluation.    Patient was initially referred in the setting of syncope.    Patient was driving to [place] apparently on a trip.  Patient describes feeling in epigastric uncomfortable sensation while driving.  Patient has history of GI issues in the past.  He apparently was in the hospital for GI blood loss last year and was diagnosed with peptic ulcer disease.  He has done well.  He does not describe any functional dyspepsia or symptoms of indigestion.  However, as he was driving he felt a slight uncomfortable sensation in the epigastric lower chest region.  He then felt as if he was going to pass out.  Patient describes that he hit the brake and was slowing down but eventually lost consciousness.  He did not have any antecedent cardiac symptoms such as chest pain or palpitations.  He did feel the lower chest or upper abdominal discomfort.  Patient does not note any seizure-like activity.  He eventually regained consciousness after a few seconds.      This was the only episode of syncope that the patient has experienced.  This was prior to last office visit.  Cardiac testing was performed    Since we have saw him, no symptoms.  No chest pain or pressure.  He can experience dyspnea with high levels of activity.  No progressive shortness of breath.  He does not describe PND orthopnea.  He has not had any palpitations or associated presyncope or syncope.  He has had some orthostatic  lightheadedness at times but otherwise has done well      Current Outpatient Medications on File Prior to Visit   Medication Sig Dispense Refill   • Multiple Vitamins-Minerals (MULTIVITAL PO) Take  by mouth Daily.     • Omega-3 Fatty Acids (FISH OIL) 1000 MG capsule capsule Take  by mouth Daily With Breakfast.     • Oxymetazoline HCl (NASAL SPRAY NA) into the nostril(s) as directed by provider.     • pantoprazole (PROTONIX) 40 MG EC tablet Take 1 tablet by mouth 2 (Two) Times a Day. 60 tablet 0   • simvastatin (ZOCOR) 10 MG tablet Take 10 mg by mouth Every Night.     • SUMAtriptan (IMITREX) 100 MG tablet Take one tablet at onset of headache. May repeat dose one time in 2 hours if headache not relieved. (Patient taking differently: Take one tablet at onset of headache. May repeat dose one time in 2 hours if headache not relieved. Patient states he uses about once every 6 months.) 12 tablet 2   • vitamin C (ASCORBIC ACID) 250 MG tablet Take 250 mg by mouth Daily.     • Vitamin D, Cholecalciferol, (CHOLECALCIFEROL) 400 units tablet Take  by mouth Daily.     • aspirin 81 MG EC tablet Take 81 mg by mouth Daily.     • butalbital-acetaminophen-caffeine (FIORICET, ESGIC) -40 MG per tablet Take 2 tablets by mouth Every 4 (Four) Hours As Needed for Headache. 20 tablet 0     No current facility-administered medications on file prior to visit.        Patient has no known allergies.    Past Medical History:   Diagnosis Date   • Anxiety    • AR (allergic rhinitis)    • CAD (coronary artery disease)    • Elevated cholesterol    • GERD (gastroesophageal reflux disease)    • Hyperlipidemia        Social History     Socioeconomic History   • Marital status:      Spouse name: Not on file   • Number of children: Not on file   • Years of education: Not on file   • Highest education level: Not on file   Tobacco Use   • Smoking status: Former Smoker     Packs/day: 1.00     Years: 10.00     Pack years: 10.00     Types:  "Cigarettes     Quit date: 1987     Years since quittin.2   • Smokeless tobacco: Never Used   Substance and Sexual Activity   • Alcohol use: No     Frequency: Never   • Drug use: Never   • Sexual activity: Defer       Family History   Problem Relation Age of Onset   • Alzheimer's disease Mother    • Hypertension Mother    • Alzheimer's disease Father    • Hypertension Father    • Coronary artery disease Father        Review of Systems   Constitutional: Negative.  Negative for chills, fatigue and fever.   HENT: Positive for rhinorrhea. Negative for congestion and sore throat.    Respiratory: Positive for shortness of breath (with exertion). Negative for apnea and chest tightness.    Cardiovascular: Negative.  Negative for chest pain, palpitations and leg swelling.   Endocrine: Positive for cold intolerance. Negative for heat intolerance.   Musculoskeletal: Positive for arthralgias and back pain. Negative for gait problem and neck pain.   Skin: Negative.  Negative for rash and wound.   Allergic/Immunologic: Positive for environmental allergies (seasonal allergies). Negative for food allergies.   Neurological: Positive for syncope (has not had anymore syncopal episodes since previous visit). Negative for dizziness, weakness, light-headedness, numbness and headaches.   Hematological: Bruises/bleeds easily (bruise/bleed slighty easily).   Psychiatric/Behavioral: Positive for sleep disturbance (occasional difficulty falling/staying asleep).   All other systems reviewed and are negative.      Objective   Vitals:    20 1440   BP: 108/65   Pulse: 77   SpO2: 98%   Weight: 82.2 kg (181 lb 3.2 oz)   Height: 182.9 cm (72\")      /65   Pulse 77   Ht 182.9 cm (72\")   Wt 82.2 kg (181 lb 3.2 oz)   SpO2 98%   BMI 24.58 kg/m²     Lab Results (most recent)     None          Physical Exam  Vitals signs and nursing note reviewed.   Constitutional:       General: He is not in acute distress.     Appearance: Normal " appearance. He is well-developed.   HENT:      Head: Normocephalic and atraumatic.   Eyes:      General: No scleral icterus.        Right eye: No discharge.         Left eye: No discharge.      Conjunctiva/sclera: Conjunctivae normal.   Neck:      Vascular: No carotid bruit.   Cardiovascular:      Rate and Rhythm: Normal rate and regular rhythm.      Heart sounds: Normal heart sounds. No murmur. No friction rub. No gallop.    Pulmonary:      Effort: Pulmonary effort is normal. No respiratory distress.      Breath sounds: Normal breath sounds. No wheezing or rales.   Chest:      Chest wall: No tenderness.   Musculoskeletal:      Right lower leg: No edema.      Left lower leg: No edema.   Skin:     General: Skin is warm and dry.      Coloration: Skin is not pale.      Findings: No erythema or rash.   Neurological:      Mental Status: He is alert and oriented to person, place, and time.      Cranial Nerves: No cranial nerve deficit.   Psychiatric:         Behavior: Behavior normal.         Procedure   Procedures       Assessment/Plan     Problems Addressed this Visit        Cardiovascular and Mediastinum    Syncope and collapse - Primary    Relevant Orders    NUCLEAR MEDICINE CARDIAC BLOOD POOL MUGA REST STRESS    Cardiomyopathy, dilated (CMS/HCC)    Relevant Orders    NUCLEAR MEDICINE CARDIAC BLOOD POOL MUGA REST STRESS    Bradycardia, sinus    Relevant Orders    NUCLEAR MEDICINE CARDIAC BLOOD POOL MUGA REST STRESS       Respiratory    Shortness of breath    Relevant Orders    NUCLEAR MEDICINE CARDIAC BLOOD POOL MUGA REST STRESS      Diagnoses       Codes Comments    Syncope and collapse    -  Primary ICD-10-CM: R55  ICD-9-CM: 780.2     Cardiomyopathy, dilated (CMS/HCC)     ICD-10-CM: I42.0  ICD-9-CM: 425.4     Bradycardia, sinus     ICD-10-CM: R00.1  ICD-9-CM: 427.89     Shortness of breath     ICD-10-CM: R06.02  ICD-9-CM: 786.05             Recommendation  1.  Patient with history of syncope although it appeared to  be vasovagal or neurocardiogenic.  I am concerned however, the patient has questionable cardiomyopathy on echocardiogram.  I would like to schedule MUGA scan to evaluate that further    2.  Otherwise no significant bradycardia arrhythmias noted on event monitoring.  No further episodes of syncope.  For now we will schedule testing and see patient back for follow-up after MUGA scan to evaluate.  He is to follow with primary as scheduled         Marlon Bergman  reports that he quit smoking about 33 years ago. His smoking use included cigarettes. He has a 10.00 pack-year smoking history. He has never used smokeless tobacco.     Patient's Body mass index is 24.58 kg/m². BMI is within normal parameters. No follow-up required..     Advance Care Planning   ACP discussion was declined by the patient. Patient has an advance directive (not in EMR), copy requested.    Electronically signed by:

## 2020-12-02 NOTE — PATIENT INSTRUCTIONS
How to Quarantine at Home  Information for Patients and Families    These instructions are for people with confirmed or suspected COVID-19 who do not need to be hospitalized and those with confirmed COVID-19 who were hospitalized and discharged to care for themselves at home.    If you were tested through the Health Department  The Health Department will monitor your wellbeing.  If it is determined that you do not need to be hospitalized and can be isolated at home, you will be monitored by staff from your local or state health department.     If you were tested through a Commercial Lab  You will need to monitor yourself and report changes in your symptoms to your doctor.  See the section below called Monitor Your Symptoms.    Follow these steps until a healthcare provider or local or state health department says you can return to your normal activities.    Stay home except to get medical care  • Restrict activities outside your home, except for getting medical care.   • Do not go to work, school, or public areas.   • Avoid using public transportation, ride-sharing, or taxis.    Separate yourself from other people and animals in your home  People  As much as possible, you should stay in a specific room and away from other people in your home. Also, you should use a separate bathroom, if available.    Animals  You should restrict contact with pets and other animals while you are sick with COVID-19, just like you would around other people. When possible, have another member of your household care for your animals while you are sick. If you are sick with COVID-19, avoid contact with your pet, including petting, snuggling, being kissed or licked, and sharing food. If you must care for your pet or be around animals while you are sick, wash your hands before and after you interact with pets and wear a facemask. See COVID-19 and Animals for more information.    Call ahead before visiting your doctor  If you have a medical  appointment, call the healthcare provider and tell them that you have or may have COVID-19. This information will help the healthcare provider’s office take steps to keep other people from getting infected or exposed.    Wear a facemask  You should wear a facemask when you are around other people (e.g., sharing a room or vehicle) or pets and before you enter a healthcare provider’s office.     If you are not able to wear a facemask (for example, because it causes trouble breathing), then people who live with you should not stay in the same room with you, or they should wear a facemask if they enter your room.    Cover your coughs and sneezes  • Cover your mouth and nose with a tissue when you cough or sneeze.   • Throw used tissues in a lined trash can.   • Immediately wash your hands with soap and water for at least 20 seconds or, if soap and water are not available, clean your hands with an alcohol-based hand  that contains at least 60% alcohol.    Clean your hands often  • Wash your hands often with soap and water for at least 20 seconds, especially after blowing your nose, coughing, or sneezing; going to the bathroom; and before eating or preparing food.     • If soap and water are not readily available, use an alcohol-based hand  with at least 60% alcohol, covering all surfaces of your hands and rubbing them together until they feel dry.    • Soap and water are the best option if hands are visibly dirty. Avoid touching your eyes, nose, and mouth with unwashed hands.    Avoid sharing personal household items  • You should not share dishes, drinking glasses, cups, eating utensils, towels, or bedding with other people or pets in your home.   • After using these items, they should be washed thoroughly with soap and water.    Clean all “high-touch” surfaces everyday  • High touch surfaces include counters, tabletops, doorknobs, bathroom fixtures, toilets, phones, keyboards, tablets, and bedside  tables.   • Also, clean any surfaces that may have blood, stool, or body fluids on them.   • Use a household cleaning spray or wipe, according to the label instructions. Labels contain instructions for safe and effective use of the cleaning product, including precautions you should take when applying the product, such as wearing gloves and making sure you have good ventilation during use of the product.    Monitor your symptoms  • Seek prompt medical attention if your illness is worsening (e.g., difficulty breathing).   • Before seeking care, call your healthcare provider and tell them that you have, or are being evaluated for, COVID-19.   • Put on a facemask before you enter the facility.     • These steps will help the healthcare provider’s office to keep other people in the office or waiting room from getting infected or exposed.   • Persons who are placed under active monitoring or facilitated self-monitoring should follow instructions provided by their local health department or occupational health professionals, as appropriate.  • If you have a medical emergency and need to call 911, notify the dispatch personnel that you have, or are being evaluated for COVID-19. If possible, put on a facemask before emergency medical services arrive.    Discontinuing home isolation  Patients with confirmed COVID-19 should remain under home isolation precautions until the risk of secondary transmission to others is thought to be low. The decision to discontinue home isolation precautions should be made on a case-by-case basis, in consultation with healthcare providers and state and local health departments.    The below content are for household members, intimate partners, and caregivers of a patient with symptomatic laboratory-confirmed COVID-19 or a patient under investigation:    Household members, intimate partners, and caregivers may have close contact with a person with symptomatic, laboratory-confirmed COVID-19 or a  person under investigation.     Close contacts should monitor their health; they should call their healthcare provider right away if they develop symptoms suggestive of COVID-19 (e.g., fever, cough, shortness of breath)     Close contacts should also follow these recommendations:  • Make sure that you understand and can help the patient follow their healthcare provider’s instructions for medication(s) and care. You should help the patient with basic needs in the home and provide support for getting groceries, prescriptions, and other personal needs.  • Monitor the patient’s symptoms. If the patient is getting sicker, call his or her healthcare provider and tell them that the patient has laboratory-confirmed COVID-19. This will help the healthcare provider’s office take steps to keep other people in the office or waiting room from getting infected. Ask the healthcare provider to call the local or [facility] health department for additional guidance. If the patient has a medical emergency and you need to call 911, notify the dispatch personnel that the patient has, or is being evaluated for COVID-19.  • Household members should stay in another room or be  from the patient as much as possible. Household members should use a separate bedroom and bathroom, if available.  • Prohibit visitors who do not have an essential need to be in the home.  • Household members should care for any pets in the home. Do not handle pets or other animals while sick.  For more information, see COVID-19 and Animals.  • Make sure that shared spaces in the home have good air flow, such as by an air conditioner or an opened window, weather permitting.  • Perform hand hygiene frequently. Wash your hands often with soap and water for at least 20 seconds or use an alcohol-based hand  that contains 60 to 95% alcohol, covering all surfaces of your hands and rubbing them together until they feel dry. Soap and water should be used  preferentially if hands are visibly dirty.  • Avoid touching your eyes, nose, and mouth with unwashed hands.  • The patient should wear a facemask when you are around other people. If the patient is not able to wear a facemask (for example, because it causes trouble breathing), you, as the caregiver, should wear a mask when you are in the same room as the patient.  • Wear a disposable facemask and gloves when you touch or have contact with the patient’s blood, stool, or body fluids, such as saliva, sputum, nasal mucus, vomit, or urine.   o Throw out disposable facemasks and gloves after using them. Do not reuse.  o When removing personal protective equipment, first remove and dispose of gloves. Then, immediately clean your hands with soap and water or alcohol-based hand . Next, remove and dispose of facemask, and immediately clean your hands again with soap and water or alcohol-based hand .  • Avoid sharing household items with the patient. You should not share dishes, drinking glasses, cups, eating utensils, towels, bedding, or other items. After the patient uses these items, you should wash them thoroughly (see below “Wash laundry thoroughly”).  • Clean all “high-touch” surfaces, such as counters, tabletops, doorknobs, bathroom fixtures, toilets, phones, keyboards, tablets, and bedside tables, every day. Also, clean any surfaces that may have blood, stool, or body fluids on them.   o Use a household cleaning spray or wipe, according to the label instructions. Labels contain instructions for safe and effective use of the cleaning product including precautions you should take when applying the product, such as wearing gloves and making sure you have good ventilation during use of the product.  • Wash laundry thoroughly.   o Immediately remove and wash clothes or bedding that have blood, stool, or body fluids on them.  o Wear disposable gloves while handling soiled items and keep soiled items away  from your body. Clean your hands (with soap and water or an alcohol-based hand ) immediately after removing your gloves.  o Read and follow directions on labels of laundry or clothing items and detergent. In general, using a normal laundry detergent according to washing machine instructions and dry thoroughly using the warmest temperatures recommended on the clothing label.  • Place all used disposable gloves, facemasks, and other contaminated items in a lined container before disposing of them with other household waste. Clean your hands (with soap and water or an alcohol-based hand ) immediately after handling these items. Soap and water should be used preferentially if hands are visibly dirty.  • Discuss any additional questions with your state or local health department or healthcare provider.    Adapted from information provided by the Centers for Disease Control and Prevention.  For more information, visit https://www.cdc.gov/coronavirus/2019-ncov/hcp/guidance-prevent-spread.html  Advance Care Planning and Advance Directives     You make decisions on a daily basis - decisions about where you want to live, your career, your home, your life. Perhaps one of the most important decisions you face is your choice for future medical care. Take time to talk with your family and your healthcare team and start planning today.  Advance Care Planning is a process that can help you:  · Understand possible future healthcare decisions in light of your own experiences  · Reflect on those decision in light of your goals and values  · Discuss your decisions with those closest to you and the healthcare professionals that care for you  · Make a plan by creating a document that reflects your wishes    Surrogate Decision Maker  In the event of a medical emergency, which has left you unable to communicate or to make your own decisions, you would need someone to make decisions for you.  It is important to discuss  your preferences for medical treatment with this person while you are in good health.     Qualities of a surrogate decision maker:  • Willing to take on this role and responsibility  • Knows what you want for future medical care  • Willing to follow your wishes even if they don't agree with them  • Able to make difficult medical decisions under stressful circumstances    Advance Directives  These are legal documents you can create that will guide your healthcare team and decision maker(s) when needed. These documents can be stored in the electronic medical record.    · Living Will - a legal document to guide your care if you have a terminal condition or a serious illness and are unable to communicate. States vary by statute in document names/types, but most forms may include one or more of the following:        -  Directions regarding life-prolonging treatments        -  Directions regarding artificially provided nutrition/hydration        -  Choosing a healthcare decision maker        -  Direction regarding organ/tissue donation    · Durable Power of  for Healthcare - this document names an -in-fact to make medical decisions for you, but it may also allow this person to make personal and financial decisions for you. Please seek the advice of an  if you need this type of document.    **Advance Directives are not required and no one may discriminate against you if you do not sign one.    Medical Orders  Many states allow specific forms/orders signed by your physician to record your wishes for medical treatment in your current state of health. This form, signed in personal communication with your physician, addresses resuscitation and other medical interventions that you may or may not want.      For more information or to schedule a time with a [facility] Advance Care Planning Facilitator contact: [email or web address] or call [phone] and someone will contact you directly.

## 2020-12-22 ENCOUNTER — APPOINTMENT (OUTPATIENT)
Dept: CARDIOLOGY | Facility: HOSPITAL | Age: 70
End: 2020-12-22

## 2022-06-15 ENCOUNTER — HOSPITAL ENCOUNTER (EMERGENCY)
Facility: HOSPITAL | Age: 72
Discharge: HOME | End: 2022-06-15
Attending: EMERGENCY MEDICINE | Admitting: EMERGENCY MEDICINE

## 2022-06-15 ENCOUNTER — APPOINTMENT (OUTPATIENT)
Dept: CT IMAGING | Facility: HOSPITAL | Age: 72
End: 2022-06-15

## 2022-06-15 VITALS
HEIGHT: 72 IN | RESPIRATION RATE: 16 BRPM | DIASTOLIC BLOOD PRESSURE: 80 MMHG | WEIGHT: 176 LBS | OXYGEN SATURATION: 97 % | TEMPERATURE: 98.4 F | BODY MASS INDEX: 23.84 KG/M2 | HEART RATE: 55 BPM | SYSTOLIC BLOOD PRESSURE: 123 MMHG

## 2022-06-15 DIAGNOSIS — R55 NEAR SYNCOPE: Primary | ICD-10-CM

## 2022-06-15 LAB
ALBUMIN SERPL-MCNC: 4.2 G/DL (ref 3.5–5.2)
ALBUMIN/GLOB SERPL: 1.6 G/DL
ALP SERPL-CCNC: 58 U/L (ref 39–117)
ALT SERPL W P-5'-P-CCNC: 14 U/L (ref 1–41)
ANION GAP SERPL CALCULATED.3IONS-SCNC: 7 MMOL/L (ref 5–15)
AST SERPL-CCNC: 18 U/L (ref 1–40)
BASOPHILS # BLD AUTO: 0.06 10*3/MM3 (ref 0–0.2)
BASOPHILS NFR BLD AUTO: 1.1 % (ref 0–1.5)
BILIRUB SERPL-MCNC: 0.9 MG/DL (ref 0–1.2)
BUN SERPL-MCNC: 14 MG/DL (ref 8–23)
BUN/CREAT SERPL: 15.2 (ref 7–25)
CALCIUM SPEC-SCNC: 9.4 MG/DL (ref 8.6–10.5)
CHLORIDE SERPL-SCNC: 105 MMOL/L (ref 98–107)
CO2 SERPL-SCNC: 29 MMOL/L (ref 22–29)
CREAT SERPL-MCNC: 0.92 MG/DL (ref 0.76–1.27)
DEPRECATED RDW RBC AUTO: 41.4 FL (ref 37–54)
EGFRCR SERPLBLD CKD-EPI 2021: 89.5 ML/MIN/1.73
EOSINOPHIL # BLD AUTO: 0.13 10*3/MM3 (ref 0–0.4)
EOSINOPHIL NFR BLD AUTO: 2.3 % (ref 0.3–6.2)
ERYTHROCYTE [DISTWIDTH] IN BLOOD BY AUTOMATED COUNT: 11.9 % (ref 12.3–15.4)
GLOBULIN UR ELPH-MCNC: 2.6 GM/DL
GLUCOSE SERPL-MCNC: 89 MG/DL (ref 65–99)
HCT VFR BLD AUTO: 44 % (ref 37.5–51)
HGB BLD-MCNC: 15 G/DL (ref 13–17.7)
HOLD SPECIMEN: NORMAL
IMM GRANULOCYTES # BLD AUTO: 0.01 10*3/MM3 (ref 0–0.05)
IMM GRANULOCYTES NFR BLD AUTO: 0.2 % (ref 0–0.5)
LYMPHOCYTES # BLD AUTO: 1.78 10*3/MM3 (ref 0.7–3.1)
LYMPHOCYTES NFR BLD AUTO: 32 % (ref 19.6–45.3)
MAGNESIUM SERPL-MCNC: 2.1 MG/DL (ref 1.6–2.4)
MCH RBC QN AUTO: 32.6 PG (ref 26.6–33)
MCHC RBC AUTO-ENTMCNC: 34.1 G/DL (ref 31.5–35.7)
MCV RBC AUTO: 95.7 FL (ref 79–97)
MONOCYTES # BLD AUTO: 0.59 10*3/MM3 (ref 0.1–0.9)
MONOCYTES NFR BLD AUTO: 10.6 % (ref 5–12)
NEUTROPHILS NFR BLD AUTO: 2.99 10*3/MM3 (ref 1.7–7)
NEUTROPHILS NFR BLD AUTO: 53.8 % (ref 42.7–76)
NRBC BLD AUTO-RTO: 0 /100 WBC (ref 0–0.2)
PLATELET # BLD AUTO: 218 10*3/MM3 (ref 140–450)
PMV BLD AUTO: 9.1 FL (ref 6–12)
POTASSIUM SERPL-SCNC: 3.9 MMOL/L (ref 3.5–5.2)
PROT SERPL-MCNC: 6.8 G/DL (ref 6–8.5)
QT INTERVAL: 406 MS
QTC INTERVAL: 450 MS
RBC # BLD AUTO: 4.6 10*6/MM3 (ref 4.14–5.8)
SODIUM SERPL-SCNC: 141 MMOL/L (ref 136–145)
TROPONIN T SERPL-MCNC: <0.01 NG/ML (ref 0–0.03)
WBC NRBC COR # BLD: 5.56 10*3/MM3 (ref 3.4–10.8)
WHOLE BLOOD HOLD COAG: NORMAL
WHOLE BLOOD HOLD SPECIMEN: NORMAL

## 2022-06-15 PROCEDURE — 93005 ELECTROCARDIOGRAM TRACING: CPT | Performed by: EMERGENCY MEDICINE

## 2022-06-15 PROCEDURE — 83735 ASSAY OF MAGNESIUM: CPT | Performed by: EMERGENCY MEDICINE

## 2022-06-15 PROCEDURE — 70496 CT ANGIOGRAPHY HEAD: CPT

## 2022-06-15 PROCEDURE — 80053 COMPREHEN METABOLIC PANEL: CPT | Performed by: EMERGENCY MEDICINE

## 2022-06-15 PROCEDURE — 99283 EMERGENCY DEPT VISIT LOW MDM: CPT

## 2022-06-15 PROCEDURE — 84484 ASSAY OF TROPONIN QUANT: CPT | Performed by: EMERGENCY MEDICINE

## 2022-06-15 PROCEDURE — 93005 ELECTROCARDIOGRAM TRACING: CPT

## 2022-06-15 PROCEDURE — 0 IOPAMIDOL PER 1 ML: Performed by: EMERGENCY MEDICINE

## 2022-06-15 PROCEDURE — 70498 CT ANGIOGRAPHY NECK: CPT

## 2022-06-15 PROCEDURE — 85025 COMPLETE CBC W/AUTO DIFF WBC: CPT | Performed by: EMERGENCY MEDICINE

## 2022-06-15 RX ORDER — SODIUM CHLORIDE 0.9 % (FLUSH) 0.9 %
10 SYRINGE (ML) INJECTION AS NEEDED
Status: DISCONTINUED | OUTPATIENT
Start: 2022-06-15 | End: 2022-06-15 | Stop reason: HOSPADM

## 2022-06-15 RX ADMIN — IOPAMIDOL 75 ML: 755 INJECTION, SOLUTION INTRAVENOUS at 11:42

## 2022-06-16 ENCOUNTER — OFFICE VISIT (OUTPATIENT)
Dept: CARDIOLOGY | Facility: HOSPITAL | Age: 72
End: 2022-06-16

## 2022-06-16 ENCOUNTER — TELEPHONE (OUTPATIENT)
Dept: CARDIOLOGY | Facility: CLINIC | Age: 72
End: 2022-06-16

## 2022-06-16 ENCOUNTER — HOSPITAL ENCOUNTER (OUTPATIENT)
Dept: CARDIOLOGY | Facility: HOSPITAL | Age: 72
Discharge: HOME | End: 2022-06-16

## 2022-06-16 VITALS
HEART RATE: 70 BPM | TEMPERATURE: 97.2 F | DIASTOLIC BLOOD PRESSURE: 56 MMHG | RESPIRATION RATE: 16 BRPM | WEIGHT: 178.31 LBS | HEIGHT: 72 IN | OXYGEN SATURATION: 99 % | BODY MASS INDEX: 24.15 KG/M2 | SYSTOLIC BLOOD PRESSURE: 103 MMHG

## 2022-06-16 DIAGNOSIS — E78.5 HYPERLIPIDEMIA, UNSPECIFIED HYPERLIPIDEMIA TYPE: ICD-10-CM

## 2022-06-16 DIAGNOSIS — R55 NEAR SYNCOPE: Primary | ICD-10-CM

## 2022-06-16 DIAGNOSIS — R55 NEAR SYNCOPE: ICD-10-CM

## 2022-06-16 PROCEDURE — 99215 OFFICE O/P EST HI 40 MIN: CPT | Performed by: NURSE PRACTITIONER

## 2022-06-16 RX ORDER — PANTOPRAZOLE SODIUM 40 MG/1
40 TABLET, DELAYED RELEASE ORAL DAILY
COMMUNITY

## 2022-06-16 RX ORDER — FLUTICASONE PROPIONATE 50 MCG
2 SPRAY, SUSPENSION (ML) NASAL DAILY PRN
COMMUNITY

## 2022-06-16 NOTE — PROGRESS NOTES
"Chief Complaint  Loss of Consciousness and Establish Care    Subjective      History of Present Illness {CC  Problem List  Visit  Diagnosis   Encounters  Notes  Medications  Labs  Result Review Imaging  Media :23}     Marlon Bergman, 71 y.o. male with HLD, GERD presents to [facility] for Loss of Consciousness and Establish Care.    Patient was recently evaluated at [facility] emergency department for complaints of presyncope, prodrome of chest tightness and dyspnea. .  Emergency department work-up revealed normal troponin, CTA shows no VBI or carotid disease, and ECGs with no evidence of arrhythmia/acute coronary syndrome. Patient was instructed to follow-up at [facility] heart and valve clinic for further evaluation.    Patient presents today noting 2 episodes of near syncope in the past 3-4 weeks. Notes a flushing in his chest around the time of these epsidoes. Notes a slight intermittent fluttering heart episode. Notes that events mostly occur when he is very calm and driving down the road. No illness, diet, or medication changes prior to recent onset of symptoms. He has been staying well hydrated lately. Blood pressures have been well controlled. Denies anginal symptoms or increased dyspnea. Walks up to 30 miunutes with no chest pain or limiting dyspnea. Previous episodes in 2020 with thorough cardiac workup completed by [place] cardiology including cardiac monitor, TTE, and stress test that were overall unremarkable.       Objective     Vital Signs:   Vitals:    06/16/22 0849 06/16/22 0851 06/16/22 0852   BP: 102/57 106/63 103/56   BP Location: Right arm Left arm Left arm   Patient Position: Sitting Standing Sitting   Cuff Size: Adult Adult Adult   Pulse: 72 89 70   Resp:   16   Temp:  97.2 °F (36.2 °C) 97.2 °F (36.2 °C)   TempSrc:  Temporal Temporal   SpO2: 99% 98% 99%   Weight:   80.9 kg (178 lb 5 oz)   Height:   182.9 cm (72\")     Body mass index is 24.18 " kg/m².  Physical Exam  Vitals and nursing note reviewed.   Constitutional:       Appearance: Normal appearance.   HENT:      Head: Normocephalic.   Eyes:      Extraocular Movements: Extraocular movements intact.   Neck:      Vascular: No carotid bruit.   Cardiovascular:      Rate and Rhythm: Normal rate and regular rhythm.      Pulses: Normal pulses.      Heart sounds: Normal heart sounds, S1 normal and S2 normal. No murmur heard.  Pulmonary:      Effort: Pulmonary effort is normal. No respiratory distress.      Breath sounds: Normal breath sounds.   Musculoskeletal:      Cervical back: Neck supple.      Right lower leg: No edema.      Left lower leg: No edema.   Skin:     General: Skin is warm and dry.   Neurological:      General: No focal deficit present.      Mental Status: He is alert.   Psychiatric:         Mood and Affect: Mood normal.         Behavior: Behavior normal.         Thought Content: Thought content normal.        Data Reviewed:{ Labs  Result Review  Imaging  Med Tab  Media :23}     ECG 12 Lead (06/15/2022 10:28)  Troponin (06/15/2022 10:21)  Magnesium (06/15/2022 10:21)  Comprehensive Metabolic Panel (06/15/2022 10:21)  CBC & Differential (06/15/2022 10:21)  CT Angiogram Neck (06/15/2022 11:42)  CT Angiogram Head (06/15/2022 11:42)    Cardiac Event Monitor (11/23/2020 07:43)  Treadmill Stress Test (11/02/2020 16:18)  Adult Transthoracic Echo Complete W/ Cont if Necessary Per Protocol (11/02/2020 15:57)  NUCLEAR MEDICINE CARDIAC BLOOD POOL MUGA REST STRESS (12/08/2020 09:29)      Assessment & Plan   Assessment and Plan {CC Problem List  Visit Diagnosis  ROS  Review (Popup)  Health Maintenance  Quality  BestPractice  Medications  SmartSets  SnapShot Encounters  Media :23}     1. Near syncope  -2 episodes of near syncope in the past 3-4 weeks. Notes a flushing in his chest around the time of these epsidoes. Notes a slight intermittent fluttering heart episode.  Unclear etiology at  this time; possibly arrhythmia versus vasovagal in nature.  Will complete echocardiogram for structural/functional evaluation, 30-day cardiac monitor for arrhythmia surveillance  -Emergency department unremarkable for cardiac etiology, CT angiogram neck with no high-grade stenosis.  - Adult Transthoracic Echo Complete W/ Cont if Necessary Per Protocol; Future  - Ambulatory Referral to [facility].  Previously followed back in 2020.  - Mobile Cardiac Outpatient Telemetry; Future  -Discussed liberating salt intake, staying well-hydrated with approximately 3 - 4 L of water daily.    2. Hyperlipidemia, unspecified hyperlipidemia type  -Continue simvastatin nightly      Follow Up {Instructions Charge Capture  Follow-up Communications :23}     Return if symptoms worsen or fail to improve.    Time Spent: I spent 41 minutes caring for Marlon Bergman on this date of service. This time includes time spent by me in the following activities: preparing for the visit, reviewing tests, obtaining and/or reviewing a separately obtained history, performing a medically appropriate examination and/or evaluation, counseling and educating the patient/family/caregiver, documenting information in the medical record and independently interpreting results and communicating that information with the patient/family/caregiver. All time noted occurred on the date of service.    Patient was given instructions and counseling regarding his condition or for health maintenance advice. Please see specific information pulled into the AVS if appropriate.  Patient was instructed to call the [facility] with any questions, concerns, or worsening symptoms.    Dictated Utilizing Dragon Dictation   Please note that portions of this note were completed with a voice recognition program.   Part of this note may be an electronic transcription/translation of spoken language to printed text using the Dragon Dictation System.

## 2022-06-16 NOTE — TELEPHONE ENCOUNTER
Ronda contacted me through staff messaging to contact pt regarding an upcoming appt.     He was given July 27th? I guess from the hub. He is wearing a monitor now and getting an echo in [place] tomorrow. He was advised by Zachery to fu here.     Looking through the history, he was a pt of Dr. Villela's office. According to him, Dr. Singer cathed him few yrs ago. He went back to the hospital and he requested En[facility] but they gave him Manohar. He did not transfer.     Can we add him on July 5th at 10:30am? Thank you

## 2022-06-16 NOTE — PROGRESS NOTES
[facility] Heart Monitor Documentation    Marlon Bergman  1950  [id number]  06/16/22      [] ZIO XT Patch  Model [id number] Prescribed for N/A Days    · Serial Number: (N + 9 Digits) N   · Apply-By Date on Box:   · USPS Tracking Number:   · USPS Tracking        [] [employer] BodyGuardian MINI PLUS Mobile Cardiac Telemetry  Model BGMINIPLUS Prescribed for 30 Days    · Serial Number: (BGM + 7 Digits) [id number]  · Shipped-By Date on Box: [id number]  · UPS Tracking Number: [id number]  · UPS Tracking      [] [employer] Body[employer]ian MINI Holter Monitor  Model BGMINIEL Prescribed for N/A Days    · Serial Number: (7 Digits)   · Shipped-By Date on Box:   · UPS Tracking Number: [id number]  · UPS Tracking        This monitor was applied to the patient's chest and checked for proper functioning.  Mr. Marlon Bergman was instructed in the proper use of this monitor.  He was given the opportunity to ask questions and left the office with the device 's instruction manual.    Freda Ellsworth MA, 10:09 EDT, 06/16/22                  [facility]

## 2022-06-16 NOTE — ED PROVIDER NOTES
Subjective   Pt presents with presyncopal spells.  Twice in the last two weeks, both while driving.  Unsure whether he was turning head at the time.  Prodrome of chest tightness and dyspnea.  One time he became symptomatic enough that he crossed the center line.  He has not had any symptoms today but decided to come for evaluation.    He had similar spells a couple of years ago and had cardiac evaluation that was negative.      PMH CAD, GERD, HL.      History provided by:  Patient and spouse      Review of Systems   Constitutional: Negative for fever.   Respiratory: Negative for cough.    Cardiovascular: Positive for chest pain.   Gastrointestinal: Negative for vomiting.   Neurological: Positive for syncope. Negative for weakness and numbness.   All other systems reviewed and are negative.      Past Medical History:   Diagnosis Date   • Anxiety    • AR (allergic rhinitis)    • CAD (coronary artery disease)    • Elevated cholesterol    • GERD (gastroesophageal reflux disease)    • Hyperlipidemia        No Known Allergies    Past Surgical History:   Procedure Laterality Date   • COLONOSCOPY     • ENDOSCOPY N/A 2019    Procedure: ESOPHAGOGASTRODUODENOSCOPY;  Surgeon: Brunner, Mark I, MD;  Location: [facility] ENDOSCOPY;  Service: Gastroenterology   • HAND SURGERY Bilateral    • TONSILLECTOMY         Family History   Problem Relation Age of Onset   • Alzheimer's disease Mother    • Hypertension Mother    • Alzheimer's disease Father    • Hypertension Father    • Coronary artery disease Father        Social History     Socioeconomic History   • Marital status:    Tobacco Use   • Smoking status: Former Smoker     Packs/day: 1.00     Years: 10.00     Pack years: 10.00     Types: Cigarettes     Quit date: 1987     Years since quittin.7   • Smokeless tobacco: Never Used   Substance and Sexual Activity   • Alcohol use: No   • Drug use: Never   • Sexual activity: Defer           Objective   Physical  Exam  Vitals and nursing note reviewed.   Constitutional:       General: He is not in acute distress.     Appearance: Normal appearance. He is not ill-appearing.   HENT:      Head: Normocephalic and atraumatic.      Mouth/Throat:      Mouth: Mucous membranes are moist.   Eyes:      General: No scleral icterus.        Right eye: No discharge.         Left eye: No discharge.      Conjunctiva/sclera: Conjunctivae normal.   Cardiovascular:      Rate and Rhythm: Normal rate and regular rhythm.      Heart sounds: No murmur heard.  Pulmonary:      Effort: Pulmonary effort is normal. No respiratory distress.      Breath sounds: Normal breath sounds. No wheezing.   Abdominal:      General: Bowel sounds are normal. There is no distension.      Palpations: Abdomen is soft.      Tenderness: There is no abdominal tenderness. There is no guarding or rebound.   Musculoskeletal:         General: No swelling. Normal range of motion.      Cervical back: Normal range of motion and neck supple.   Skin:     General: Skin is warm and dry.      Findings: No rash.   Neurological:      General: No focal deficit present.      Mental Status: He is alert and oriented to person, place, and time. Mental status is at baseline.   Psychiatric:         Mood and Affect: Mood normal.         Behavior: Behavior normal.         Thought Content: Thought content normal.         Procedures           ED Course         EKG NSR.  Labs benign.  CTA shows no VBI or carotid disease.    Patient stable on serial rechecks.  Discussed findings, concerns, plan of care, expected course, reasons to return and followup.  Provided the opportunity to ask questions.    Pt counseled he shouldn't drive until he has completed workup and been cleared.                                        MDM  Number of Diagnoses or Management Options     Amount and/or Complexity of Data Reviewed  Clinical lab tests: reviewed and ordered  Tests in the radiology section of CPT®: reviewed and  ordered  Independent visualization of images, tracings, or specimens: yes        Final diagnoses:   Near syncope       ED Disposition  ED Disposition     ED Disposition   Discharge    Condition   Stable    Comment   --             [facility] CARDIOLOGY  [address]  [place] [address] [phone]  Schedule an appointment as soon as possible for a visit       Baldemar Espino MD  [address] [place] [id number]  [phone]    In 1 week           Medication List      Changed    SUMAtriptan 100 MG tablet  Commonly known as: IMITREX  Take one tablet at onset of headache. May repeat dose one time in 2 hours if headache not relieved.  What changed: additional instructions             Jorge Alvarez MD  06/15/22 2008

## 2022-06-17 NOTE — TELEPHONE ENCOUNTER
APPT MADE FOR 07/05/2022 @ 10:30 WITH GLORIA FLOREZ. CALLED AND SPOKE WITH PATIENT AND HE IS AWARE OF APPT.

## 2022-06-27 ENCOUNTER — HOSPITAL ENCOUNTER (OUTPATIENT)
Dept: CARDIOLOGY | Facility: HOSPITAL | Age: 72
Discharge: HOME | End: 2022-06-27
Admitting: NURSE PRACTITIONER

## 2022-06-27 DIAGNOSIS — R55 NEAR SYNCOPE: ICD-10-CM

## 2022-06-27 LAB
BH CV ECHO MEAS - AO ROOT DIAM: 2.6 CM
BH CV ECHO MEAS - EDV(CUBED): 148.9 ML
BH CV ECHO MEAS - EDV(MOD-SP4): 38.6 ML
BH CV ECHO MEAS - EF(MOD-SP4): 56 %
BH CV ECHO MEAS - ESV(CUBED): 39.3 ML
BH CV ECHO MEAS - ESV(MOD-SP4): 17 ML
BH CV ECHO MEAS - FS: 35.8 %
BH CV ECHO MEAS - IVS/LVPW: 0.93 CM
BH CV ECHO MEAS - IVSD: 1.4 CM
BH CV ECHO MEAS - LA DIMENSION: 3.8 CM
BH CV ECHO MEAS - LAT PEAK E' VEL: 14.1 CM/SEC
BH CV ECHO MEAS - LV DIASTOLIC VOL/BSA (35-75): 19 CM2
BH CV ECHO MEAS - LV MASS(C)D: 335.5 GRAMS
BH CV ECHO MEAS - LV SYSTOLIC VOL/BSA (12-30): 8.4 CM2
BH CV ECHO MEAS - LVIDD: 5.3 CM
BH CV ECHO MEAS - LVIDS: 3.4 CM
BH CV ECHO MEAS - LVOT AREA: 2.5 CM2
BH CV ECHO MEAS - LVOT DIAM: 1.8 CM
BH CV ECHO MEAS - LVPWD: 1.5 CM
BH CV ECHO MEAS - MED PEAK E' VEL: 10.6 CM/SEC
BH CV ECHO MEAS - MV A MAX VEL: 52.3 CM/SEC
BH CV ECHO MEAS - MV E MAX VEL: 60.8 CM/SEC
BH CV ECHO MEAS - MV E/A: 1.16
BH CV ECHO MEAS - SI(MOD-SP4): 10.7 ML/M2
BH CV ECHO MEAS - SV(MOD-SP4): 21.6 ML
BH CV ECHO MEASUREMENTS AVERAGE E/E' RATIO: 4.92
MAXIMAL PREDICTED HEART RATE: 150 BPM
STRESS TARGET HR: 128 BPM

## 2022-06-27 PROCEDURE — 93306 TTE W/DOPPLER COMPLETE: CPT

## 2022-06-27 PROCEDURE — 93306 TTE W/DOPPLER COMPLETE: CPT | Performed by: INTERNAL MEDICINE

## 2022-07-05 ENCOUNTER — OFFICE VISIT (OUTPATIENT)
Dept: CARDIOLOGY | Facility: CLINIC | Age: 72
End: 2022-07-05

## 2022-07-05 VITALS
DIASTOLIC BLOOD PRESSURE: 72 MMHG | SYSTOLIC BLOOD PRESSURE: 112 MMHG | HEART RATE: 75 BPM | BODY MASS INDEX: 24.08 KG/M2 | WEIGHT: 177.8 LBS | HEIGHT: 72 IN

## 2022-07-05 DIAGNOSIS — R06.02 SHORTNESS OF BREATH: ICD-10-CM

## 2022-07-05 DIAGNOSIS — R06.09 DYSPNEA ON EXERTION: ICD-10-CM

## 2022-07-05 DIAGNOSIS — I47.20 VT (VENTRICULAR TACHYCARDIA): Primary | ICD-10-CM

## 2022-07-05 DIAGNOSIS — R55 SYNCOPE AND COLLAPSE: ICD-10-CM

## 2022-07-05 DIAGNOSIS — R94.39 ABNORMAL NUCLEAR STRESS TEST: ICD-10-CM

## 2022-07-05 DIAGNOSIS — R55 NEAR SYNCOPE: ICD-10-CM

## 2022-07-05 DIAGNOSIS — E78.00 HYPERCHOLESTEREMIA: ICD-10-CM

## 2022-07-05 PROCEDURE — 93000 ELECTROCARDIOGRAM COMPLETE: CPT | Performed by: NURSE PRACTITIONER

## 2022-07-05 PROCEDURE — 99214 OFFICE O/P EST MOD 30 MIN: CPT | Performed by: NURSE PRACTITIONER

## 2022-07-05 RX ORDER — METOPROLOL SUCCINATE 25 MG/1
25 TABLET, EXTENDED RELEASE ORAL DAILY
Qty: 90 TABLET | Refills: 3 | Status: SHIPPED | OUTPATIENT
Start: 2022-07-05 | End: 2022-07-13 | Stop reason: HOSPADM

## 2022-07-05 NOTE — PROGRESS NOTES
Subjective     Chief Complaint   Patient presents with   • Establish Care     Patient wishing to transfer care.   • Syncope     Had episode he felt that he was going to collapse, had odd feeling in chest, noticed darkness in vision. Had episode of syncope years ago while driving and a month ago felt like he was going to have another episode. Currently wearing holter monitor.  He spoke with nurse at Dr Albert office, sending some monitor readings.    • Lab     Last labs in chart. Last stress, echo, CTA of head and neck in chart.     Initial cardiac evaluation;    [facility] [place]    Mr. Marlon Bergman is a 71-year-old gentleman with no significant past cardiac history other than mild hyperlipidemia treated with simvastatin.  Remote cardiac cath 10-15 years ago reported as minor nonobstructive disease.  He had GI bleed in 2019, diagnosed with gastric ulcer.  He has not taken aspirin since then.  Echo at that time reported as normal.      He presents today for initial cardiac evaluation of recurrent near syncopal episodes and requests clearance to drive.  The first episode occurred in 2019 but no formal work-up done.  Had another episode in October 2020 when he was driving to [place].  He developed a funny feeling in the lower part of his chest radiating upward.  Everything went dark and felt he would pass out. He pulled over to the side and lost consciousness for few seconds.  He was then referred to another cardiologist where he wore a cardiac event monitor x 14 days showing sinus with few PVCs, no VT, no pause. MUGA scan showed normal EF.  Treadmill stress test showed no EKG changes with exercise.  He did not return for follow-up.    He recently had a similar episode of near syncope on 6/15/2022 which occurred while driving.  He was seen in [facility] ED describing similar prodrome.  He was driving, felt his heart flutter with mild pressure and again felt he would pass out but he did not.  EKG NSR, CBC, CMP, mag and troponin normal. CTA  head and neck showed no carotid or vertebral stenosis.  30-day MCOT placed on 6/16/2022, patient still wearing, report pending.  Strips showed multiple runs of VT.    Upon questioning, is having more frequent symptoms of palpitations associated with dizziness but no syncope since wearing monitor.  He denies anginal chest pain but mild dyspnea with exertion.  No recurrent GI bleeding.  He is retired and maintains fairly active lifestyle.  He was planning a beach vacation with his grandchildren next week.  He has not driven since most recent near syncope. Lipids 10/2019 showed , TRI 66, HDL 34, LDL 68.  He quit smoking 35 years ago.  Does not drink alcohol.  No family history of early CAD, pacemaker or defibrillator placement.    Cardiac History  Past Surgical History:   Procedure Laterality Date   • CONVERTED (HISTORICAL) HOLTER  06/16/2022    MCOT (pending) multiple runs of VT, longest 6 seconds   • CONVERTED (HISTORICAL) HOLTER  12/02/2020    14-day-53/140, few PVCs, no VT, no pauses   • ECHO - CONVERTED  06/27/2022    ([place]) EF 60%, normal LV, LA, no valvular pathology   • ECHO - CONVERTED  09/23/2019    ([facility]) EF 60%, mild MR, RVSP 40 mmHg   • ENDOSCOPY N/A 09/26/2019    Procedure: ESOPHAGOGASTRODUODENOSCOPY;  Surgeon: Brunner, Mark I, MD;  Location: [facility] ENDOSCOPY;  Service: Gastroenterology   • OTHER SURGICAL HISTORY  12/04/2020    Muga-EF 56%     Current Outpatient Medications   Medication Sig Dispense Refill   • fluticasone (FLONASE) 50 MCG/ACT nasal spray 2 sprays into the nostril(s) as directed by provider Daily As Needed for Rhinitis.     • Multiple Vitamins-Minerals (MULTIVITAL PO) Take  by mouth Daily.     • Omega-3 Fatty Acids (FISH OIL) 1000 MG capsule capsule Take  by mouth Daily With Breakfast.     • pantoprazole (PROTONIX) 40 MG EC tablet Take 40 mg by mouth Daily.     • simvastatin (ZOCOR) 10 MG tablet Take 10 mg by mouth Every Night.     • SUMAtriptan (IMITREX) 100 MG tablet Take one  tablet at onset of headache. May repeat dose one time in 2 hours if headache not relieved. (Patient taking differently: Take one tablet at onset of headache. May repeat dose one time in 2 hours if headache not relieved. Patient states he uses about once every 6 months.) 12 tablet 2   • vitamin C (ASCORBIC ACID) 250 MG tablet Take 250 mg by mouth Daily.     • Vitamin D, Cholecalciferol, (CHOLECALCIFEROL) 400 units tablet Take  by mouth Daily.     • metoprolol succinate XL (TOPROL-XL) 25 MG 24 hr tablet Take 1 tablet by mouth Daily. 90 tablet 3     No current facility-administered medications for this visit.     Aspirin and Codeine    Past Medical History:   Diagnosis Date   • Anxiety    • AR (allergic rhinitis)    • CAD (coronary artery disease)    • Elevated cholesterol    • GERD (gastroesophageal reflux disease)    • Hyperlipidemia      Social History     Socioeconomic History   • Marital status:    Tobacco Use   • Smoking status: Former Smoker     Packs/day: 1.00     Years: 10.00     Pack years: 10.00     Types: Cigarettes     Quit date: 1987     Years since quittin.8   • Smokeless tobacco: Never Used   Vaping Use   • Vaping Use: Never used   Substance and Sexual Activity   • Alcohol use: No   • Drug use: Never   • Sexual activity: Defer     Family History   Problem Relation Age of Onset   • Alzheimer's disease Mother    • Hypertension Mother    • Heart disease Father    • Alzheimer's disease Father    • Hypertension Father    • Coronary artery disease Father    • Cancer Sister    • Dementia Sister    • No Known Problems Brother    • No Known Problems Maternal Grandmother    • Stroke Maternal Grandfather    • Diabetes Paternal Grandmother    • Cancer Paternal Grandmother    • No Known Problems Paternal Grandfather    • No Known Problems Daughter    • No Known Problems Daughter      Review of Systems   Constitutional: Negative.  Negative for activity change, diaphoresis and unexpected weight  "change.   Eyes: Negative.    Respiratory: Positive for chest tightness and shortness of breath.    Cardiovascular: Positive for palpitations. Negative for chest pain and leg swelling.        Multiple episodes of syncope and near syncope   Gastrointestinal: Negative.    Endocrine: Negative.    Genitourinary: Negative.    Musculoskeletal: Negative.    Skin: Negative.    Allergic/Immunologic: Negative.    Neurological: Positive for dizziness, syncope, light-headedness and headaches (History of migraine).   Hematological: Negative.    Psychiatric/Behavioral: Negative.      Diabetes- No  Thyroid- normal    Objective     /72 (BP Location: Right arm)   Pulse 75   Ht 182.9 cm (72\")   Wt 80.6 kg (177 lb 12.8 oz)   BMI 24.11 kg/m²     Physical Exam  Vitals and nursing note reviewed.   Constitutional:       General: He is not in acute distress.     Appearance: Normal appearance. He is normal weight. He is not ill-appearing.   HENT:      Head: Normocephalic and atraumatic.      Right Ear: External ear normal.      Left Ear: External ear normal.   Eyes:      Extraocular Movements: Extraocular movements intact.      Pupils: Pupils are equal, round, and reactive to light.   Neck:      Vascular: No carotid bruit.   Cardiovascular:      Rate and Rhythm: Normal rate and regular rhythm.      Pulses: Normal pulses.      Heart sounds: Normal heart sounds. No murmur heard.  Pulmonary:      Effort: Pulmonary effort is normal.      Breath sounds: Normal breath sounds.   Abdominal:      General: Abdomen is flat. Bowel sounds are normal.      Palpations: Abdomen is soft.   Musculoskeletal:         General: No swelling. Normal range of motion.      Cervical back: Normal range of motion and neck supple.   Skin:     General: Skin is warm and dry.      Capillary Refill: Capillary refill takes less than 2 seconds.   Neurological:      General: No focal deficit present.      Mental Status: He is alert and oriented to person, place, and " time. Mental status is at baseline.   Psychiatric:         Mood and Affect: Mood normal.         Behavior: Behavior normal.         ECG 12 Lead    Date/Time: 7/5/2022 5:00 PM  Performed by: Miri Godoy APRN  Authorized by: Miri Godoy APRN   Comparison: compared with previous ECG   Rhythm: sinus rhythm  Rate: normal  BPM: 75  ST Segments: ST segments normal  ST Flattening: II  T inversion: III and II  Other findings: non-specific ST-T wave changes    Clinical impression: normal ECG          Assessment & Plan     Diagnoses and all orders for this visit:    1. VT (ventricular tachycardia) (HCC) (Primary)  -     Stress Test With Myocardial Perfusion One Day; Future  -     Ambulatory Referral to Cardiac Electrophysiology  -     ECG 12 Lead    2. Near syncope  -     Stress Test With Myocardial Perfusion One Day; Future  -     ECG 12 Lead    3. Hypercholesteremia  -     Stress Test With Myocardial Perfusion One Day; Future    4. Dyspnea on exertion  -     Stress Test With Myocardial Perfusion One Day; Future  -     ECG 12 Lead    5. Syncope and collapse  -     ECG 12 Lead    6. Shortness of breath    Other orders  -     metoprolol succinate XL (TOPROL-XL) 25 MG 24 hr tablet; Take 1 tablet by mouth Daily.  Dispense: 90 tablet; Refill: 3       Clinical exam reveals normal S1, S2 with no significant murmur, no ectopic beats.  Normal peripheral pulses, lungs clear bilaterally, no bruit.  EKG showed sinus rhythm 75 bpm with T wave changes noted in the inferior leads.    1.  Recurrent syncope- unknown etiology, likely arrhythmia.  CTA normal, echo no valvular pathology normal LVEF.    2.  Ventricular tachycardia- EKG today showed sinus rhythm with nonspecific T wave changes.  Strips reviewed from [facility] showing multiple short runs of VT, longest 6+ second VT.  Continue to wear monitor.  Need to rule out ischemic etiology.  Recommend nuclear stress to evaluate exercise tolerance, exercise-induced arrhythmia and rule out  ischemic changes.  Recent Holter reviewed showing normal LV function.  Mag 2.1.  Discussed with Dr. Singer.  We will add Toprol-XL 25 mg once daily.    3.  Hyperlipidemia- continue simvastatin, LDL 68/HDL 34 in 2019.  Will repeat lipids.    4.  Labs reported normal with CBC, CMP, mag 2.1, TSH 2.3 (in 2020) on 6/15/2022.  Followed routinely with Dr. Espino, reported as well controlled.    Further recommendations to follow stress test.  If ischemia noted, will proceed with cardiac cath.  If recurrent syncope associated with VT noted on monitor and no ischemia seen on nuclear images, will proceed with EP referral for EP study.

## 2022-07-06 ENCOUNTER — HOSPITAL ENCOUNTER (OUTPATIENT)
Dept: CARDIOLOGY | Facility: HOSPITAL | Age: 72
Discharge: HOME | End: 2022-07-06

## 2022-07-06 DIAGNOSIS — R06.09 DYSPNEA ON EXERTION: ICD-10-CM

## 2022-07-06 DIAGNOSIS — E78.00 HYPERCHOLESTEREMIA: ICD-10-CM

## 2022-07-06 DIAGNOSIS — R55 NEAR SYNCOPE: ICD-10-CM

## 2022-07-06 DIAGNOSIS — I47.20 VT (VENTRICULAR TACHYCARDIA): ICD-10-CM

## 2022-07-06 LAB
BH CV REST NUCLEAR ISOTOPE DOSE: 10 MCI
BH CV STRESS NUCLEAR ISOTOPE DOSE: 30 MCI
BH CV STRESS RECOVERY BP: NORMAL MMHG
BH CV STRESS RECOVERY HR: 70 BPM
LV EF NUC BP: 65 %
MAXIMAL PREDICTED HEART RATE: 150 BPM
PERCENT MAX PREDICTED HR: 90.67 %
STRESS BASELINE BP: NORMAL MMHG
STRESS BASELINE HR: 58 BPM
STRESS PERCENT HR: 107 %
STRESS POST ESTIMATED WORKLOAD: 10.1 METS
STRESS POST EXERCISE DUR MIN: 7 MIN
STRESS POST EXERCISE DUR SEC: 30 SEC
STRESS POST PEAK BP: NORMAL MMHG
STRESS POST PEAK HR: 136 BPM
STRESS TARGET HR: 128 BPM

## 2022-07-06 PROCEDURE — 93018 CV STRESS TEST I&R ONLY: CPT | Performed by: INTERNAL MEDICINE

## 2022-07-06 PROCEDURE — 78452 HT MUSCLE IMAGE SPECT MULT: CPT

## 2022-07-06 PROCEDURE — 93017 CV STRESS TEST TRACING ONLY: CPT

## 2022-07-06 PROCEDURE — A9500 TC99M SESTAMIBI: HCPCS | Performed by: INTERNAL MEDICINE

## 2022-07-06 PROCEDURE — 0 TECHNETIUM SESTAMIBI: Performed by: INTERNAL MEDICINE

## 2022-07-06 PROCEDURE — 78452 HT MUSCLE IMAGE SPECT MULT: CPT | Performed by: INTERNAL MEDICINE

## 2022-07-06 RX ADMIN — TECHNETIUM TC 99M SESTAMIBI 1 DOSE: 1 INJECTION INTRAVENOUS at 10:36

## 2022-07-06 RX ADMIN — TECHNETIUM TC 99M SESTAMIBI 1 DOSE: 1 INJECTION INTRAVENOUS at 08:56

## 2022-07-07 ENCOUNTER — TELEPHONE (OUTPATIENT)
Dept: CARDIOLOGY | Facility: CLINIC | Age: 72
End: 2022-07-07

## 2022-07-07 ENCOUNTER — PATIENT ROUNDING (BHMG ONLY) (OUTPATIENT)
Dept: CARDIOLOGY | Facility: CLINIC | Age: 72
End: 2022-07-07

## 2022-07-07 DIAGNOSIS — I47.20 VT (VENTRICULAR TACHYCARDIA): ICD-10-CM

## 2022-07-07 DIAGNOSIS — R94.39 ABNORMAL NUCLEAR STRESS TEST: Primary | ICD-10-CM

## 2022-07-07 DIAGNOSIS — R55 NEAR SYNCOPE: ICD-10-CM

## 2022-07-07 RX ORDER — AMLODIPINE BESYLATE 5 MG/1
5 TABLET ORAL DAILY
Qty: 30 TABLET | Refills: 11 | Status: SHIPPED | OUTPATIENT
Start: 2022-07-07 | End: 2022-08-04

## 2022-07-07 NOTE — TELEPHONE ENCOUNTER
I spoke with patient regarding scheduling cardiac catheterization.  Patient does want to proceed with as soon as possible.  I spoke with KARTHIK Bingham and Dr. Plaza does have opening next week.  Cath lab request placed and patient aware scheduling will be calling him to set up date, time, and instructions.

## 2022-07-07 NOTE — PROGRESS NOTES
July 7, 2022    Hello, may I speak with Marlon Bergman?    My name is jorge hoyt     I am  with [facility]  [facility]  [address] [place] [address] [phone].    Before we get started may I verify your date of birth? 1950    I am calling to officially welcome you to our practice and ask about your recent visit. Is this a good time to talk? yes    Tell me about your visit with us. What things went well? Everyone was excellent, efficient and very concerned . Made me feel very comfortable-very thorough        We're always looking for ways to make our patients' experiences even better. Do you have recommendations on ways we may improve?  yes-they are very good     Overall were you satisfied with your first visit to our practice? Yes-sure was        I appreciate you taking the time to speak with me today. Is there anything else I can do for you? no      Thank you, and have a great day.

## 2022-07-11 ENCOUNTER — PREP FOR SURGERY (OUTPATIENT)
Dept: OTHER | Facility: HOSPITAL | Age: 72
End: 2022-07-11

## 2022-07-11 RX ORDER — SODIUM CHLORIDE 0.9 % (FLUSH) 0.9 %
10 SYRINGE (ML) INJECTION EVERY 12 HOURS SCHEDULED
Status: CANCELLED | OUTPATIENT
Start: 2022-07-11

## 2022-07-11 RX ORDER — SODIUM CHLORIDE 0.9 % (FLUSH) 0.9 %
10 SYRINGE (ML) INJECTION AS NEEDED
Status: CANCELLED | OUTPATIENT
Start: 2022-07-11

## 2022-07-13 ENCOUNTER — HOSPITAL ENCOUNTER (OUTPATIENT)
Facility: HOSPITAL | Age: 72
Setting detail: HOSPITAL OUTPATIENT SURGERY
Discharge: HOME | End: 2022-07-13
Attending: INTERNAL MEDICINE | Admitting: INTERNAL MEDICINE

## 2022-07-13 VITALS
SYSTOLIC BLOOD PRESSURE: 105 MMHG | DIASTOLIC BLOOD PRESSURE: 70 MMHG | BODY MASS INDEX: 24.33 KG/M2 | OXYGEN SATURATION: 99 % | HEART RATE: 56 BPM | TEMPERATURE: 97.4 F | WEIGHT: 179.6 LBS | HEIGHT: 72 IN | RESPIRATION RATE: 16 BRPM

## 2022-07-13 DIAGNOSIS — I47.20 VT (VENTRICULAR TACHYCARDIA): ICD-10-CM

## 2022-07-13 DIAGNOSIS — R55 NEAR SYNCOPE: ICD-10-CM

## 2022-07-13 DIAGNOSIS — R94.39 ABNORMAL NUCLEAR STRESS TEST: ICD-10-CM

## 2022-07-13 LAB
ALBUMIN SERPL-MCNC: 4.1 G/DL (ref 3.5–5.2)
ALBUMIN/GLOB SERPL: 1.7 G/DL
ALP SERPL-CCNC: 55 U/L (ref 39–117)
ALT SERPL W P-5'-P-CCNC: 12 U/L (ref 1–41)
ANION GAP SERPL CALCULATED.3IONS-SCNC: 7 MMOL/L (ref 5–15)
AST SERPL-CCNC: 16 U/L (ref 1–40)
BILIRUB SERPL-MCNC: 0.9 MG/DL (ref 0–1.2)
BUN SERPL-MCNC: 16 MG/DL (ref 8–23)
BUN/CREAT SERPL: 17 (ref 7–25)
CALCIUM SPEC-SCNC: 9 MG/DL (ref 8.6–10.5)
CHLORIDE SERPL-SCNC: 104 MMOL/L (ref 98–107)
CHOLEST SERPL-MCNC: 125 MG/DL (ref 0–200)
CO2 SERPL-SCNC: 28 MMOL/L (ref 22–29)
CREAT SERPL-MCNC: 0.94 MG/DL (ref 0.76–1.27)
DEPRECATED RDW RBC AUTO: 41.2 FL (ref 37–54)
EGFRCR SERPLBLD CKD-EPI 2021: 87.2 ML/MIN/1.73
ERYTHROCYTE [DISTWIDTH] IN BLOOD BY AUTOMATED COUNT: 11.9 % (ref 12.3–15.4)
GLOBULIN UR ELPH-MCNC: 2.4 GM/DL
GLUCOSE SERPL-MCNC: 89 MG/DL (ref 65–99)
HBA1C MFR BLD: 5.1 % (ref 4.8–5.6)
HCT VFR BLD AUTO: 43.4 % (ref 37.5–51)
HDLC SERPL-MCNC: 44 MG/DL (ref 40–60)
HGB BLD-MCNC: 14.7 G/DL (ref 13–17.7)
LDLC SERPL CALC-MCNC: 67 MG/DL (ref 0–100)
LDLC/HDLC SERPL: 1.54 {RATIO}
MCH RBC QN AUTO: 32.3 PG (ref 26.6–33)
MCHC RBC AUTO-ENTMCNC: 33.9 G/DL (ref 31.5–35.7)
MCV RBC AUTO: 95.4 FL (ref 79–97)
PLATELET # BLD AUTO: 204 10*3/MM3 (ref 140–450)
PMV BLD AUTO: 9.2 FL (ref 6–12)
POTASSIUM SERPL-SCNC: 3.9 MMOL/L (ref 3.5–5.2)
PROT SERPL-MCNC: 6.5 G/DL (ref 6–8.5)
RBC # BLD AUTO: 4.55 10*6/MM3 (ref 4.14–5.8)
SODIUM SERPL-SCNC: 139 MMOL/L (ref 136–145)
TRIGL SERPL-MCNC: 67 MG/DL (ref 0–150)
VLDLC SERPL-MCNC: 14 MG/DL (ref 5–40)
WBC NRBC COR # BLD: 5.59 10*3/MM3 (ref 3.4–10.8)

## 2022-07-13 PROCEDURE — 80053 COMPREHEN METABOLIC PANEL: CPT

## 2022-07-13 PROCEDURE — 80061 LIPID PANEL: CPT

## 2022-07-13 PROCEDURE — 83036 HEMOGLOBIN GLYCOSYLATED A1C: CPT

## 2022-07-13 PROCEDURE — C1769 GUIDE WIRE: HCPCS | Performed by: INTERNAL MEDICINE

## 2022-07-13 PROCEDURE — S0260 H&P FOR SURGERY: HCPCS | Performed by: INTERNAL MEDICINE

## 2022-07-13 PROCEDURE — 0 IOPAMIDOL PER 1 ML: Performed by: INTERNAL MEDICINE

## 2022-07-13 PROCEDURE — 85027 COMPLETE CBC AUTOMATED: CPT

## 2022-07-13 PROCEDURE — 93458 L HRT ARTERY/VENTRICLE ANGIO: CPT | Performed by: INTERNAL MEDICINE

## 2022-07-13 PROCEDURE — C1894 INTRO/SHEATH, NON-LASER: HCPCS | Performed by: INTERNAL MEDICINE

## 2022-07-13 RX ORDER — SODIUM CHLORIDE 0.9 % (FLUSH) 0.9 %
10 SYRINGE (ML) INJECTION AS NEEDED
Status: DISCONTINUED | OUTPATIENT
Start: 2022-07-13 | End: 2022-07-13 | Stop reason: HOSPADM

## 2022-07-13 RX ORDER — LIDOCAINE HYDROCHLORIDE 10 MG/ML
INJECTION, SOLUTION EPIDURAL; INFILTRATION; INTRACAUDAL; PERINEURAL AS NEEDED
Status: DISCONTINUED | OUTPATIENT
Start: 2022-07-13 | End: 2022-07-13 | Stop reason: HOSPADM

## 2022-07-13 RX ORDER — SODIUM CHLORIDE 9 MG/ML
150 INJECTION, SOLUTION INTRAVENOUS CONTINUOUS
Status: ACTIVE | OUTPATIENT
Start: 2022-07-13 | End: 2022-07-13

## 2022-07-13 RX ORDER — LEVOCETIRIZINE DIHYDROCHLORIDE 5 MG/1
5 TABLET, FILM COATED ORAL EVERY EVENING
COMMUNITY

## 2022-07-13 RX ORDER — ACETAMINOPHEN 325 MG/1
650 TABLET ORAL EVERY 4 HOURS PRN
Status: DISCONTINUED | OUTPATIENT
Start: 2022-07-13 | End: 2022-07-13 | Stop reason: HOSPADM

## 2022-07-13 RX ORDER — SODIUM CHLORIDE 9 MG/ML
3 INJECTION, SOLUTION INTRAVENOUS CONTINUOUS
Status: ACTIVE | OUTPATIENT
Start: 2022-07-13 | End: 2022-07-13

## 2022-07-13 RX ORDER — SODIUM CHLORIDE 0.9 % (FLUSH) 0.9 %
10 SYRINGE (ML) INJECTION EVERY 12 HOURS SCHEDULED
Status: DISCONTINUED | OUTPATIENT
Start: 2022-07-13 | End: 2022-07-13 | Stop reason: HOSPADM

## 2022-07-13 RX ADMIN — SODIUM CHLORIDE 3 ML/KG/HR: 9 INJECTION, SOLUTION INTRAVENOUS at 07:22

## 2022-07-13 NOTE — H&P
Pre-cardiac Catheterization History and Physical  [facility]      Patient:  Marlon Bergman  :  1950  MRN: [id number]    PCP:  Baldemar Espino MD  PHONE:  [phone]    DATE: 2022  ID: Marlon Bergman is a 71 y.o. male    CC: Abnormal stress test, presyncope, ventricular tachycardia    PROBLEM LIST:  1. Ventricular tachycardia   1. MCOT, 2022: Monitored 17 days. VT 6.2 sec. SR with PACs/artifact.   2. MPS, 2022: EF 65%. Small area of hypoperfusion involving the apex seen during the stress improved at rest. Small apical ischemia noted. Average exercise tolerance. Mild increase in chronotropic response. Hypertensive BP response. NSST-T changes seen.   2. Syncope  1. Echocardiogram, 2019: EF 60%. Mild MR. Mild PHTN. Suboptimal study.   2. Echocardiogram, 2020: EF 45±5%. Mild concentric LVH. Grade I diastolic dysfunction. Mild LAE. Borderline LOIS. Technically limited study.  Only subcostal views are readily interpretable.   3. GXT, 2020: Exercised 9:20 and stopped due to SOB. <1/2 mm ST segment depression ~1 to 2 min into recovery. Low risk study demonstrating very good to excellent functional capacity without symptoms of ischemia or diagnostic EKG changes.   4. MUGA, 2020 ([facility]): EF 56%.   5. Echocardiogram, 2022: EF 56-60%. Borderline concentric LVH. Technically poor quality echo.   6. MCOT, 11/3/2020: Monitored 14 days. NSR. PVCs. Controlled rate 88% of the time.   7. CTA Neck, 6/15/2022: Patent bilateral carotid arteries. No high-grade stenosis. Mild atherosclerotic calcification at proximal left ICA. Left sphenoid sinus disease. Patent major intracranial vasculature without findings of large vessel occlusion.   8. MCOT, 2022: Monitored 17 days. VT 6.2 sec. SR with PACs/artifact.   9. Echocardiogram, 2022: EF 56-60%. Borderline concentric LVH. Technically poor quality echo.   3. Hyperlipidemia  4. Anemia    BRIEF HPI:  Patient is a 71-year-old male with a medical history as listed above.  The patient notes that he had a syncopal episode in October 2020.  He underwent extensive cardiac study which was benign.  He had a presyncopal episode approximately 5 weeks ago and underwent another cardiac study.  He was found to have a small area of hypoperfusion in the apex on his stress test as well as a 6-second run of V. tach on his cardiac monitor.  As result he is being seen today for heart catheterization.  He denies any chest pain, shortness of breath, palpitations, orthopnea, and edema.    Cardiac Risk Factors: advanced age (older than 55 for men, 65 for women), dyslipidemia and male gender    Allergies:      Allergies   Allergen Reactions   • Aspirin Other (See Comments)     Stomach ulcers   • Codeine Nausea And Vomiting       MEDICATIONS:  Current Outpatient Medications   Medication Instructions   • amLODIPine (NORVASC) 5 mg, Oral, Daily   • fluticasone (FLONASE) 50 MCG/ACT nasal spray 2 sprays, Nasal, Daily PRN   • levocetirizine (XYZAL) 5 mg, Oral, Every Evening   • metoprolol succinate XL (TOPROL-XL) 25 mg, Oral, Daily   • Multiple Vitamins-Minerals (MULTIVITAL PO) Oral, Daily   • Omega-3 Fatty Acids (FISH OIL) 1000 MG capsule capsule Oral, Daily With Breakfast   • pantoprazole (PROTONIX) 40 mg, Oral, Daily   • simvastatin (ZOCOR) 10 mg, Oral, Nightly   • SUMAtriptan (IMITREX) 100 MG tablet Take one tablet at onset of headache. May repeat dose one time in 2 hours if headache not relieved.   • vitamin C (ASCORBIC ACID) 250 mg, Oral, Daily   • Vitamin D, Cholecalciferol, (CHOLECALCIFEROL) 400 units tablet Oral, Daily       Past medical & surgical history, social and family history reviewed in the electronic medical record.    ROS: Pertinent positives listed in the HPI and problem list above. All others reviewed and negative.     Physical Exam:   /83 (BP Location: Left arm, Patient Position: Lying)   Pulse 54   Temp 97.4 °F  "(36.3 °C) (Temporal)   Resp 18   Ht 182.9 cm (72\")   Wt 81.5 kg (179 lb 9.6 oz)   SpO2 100%   BMI 24.36 kg/m²     Constitutional:    Alert, cooperative, in no acute distress   Neck:     No Jugular venous distention, adenopathy, or thyromegaly noted.    Heart:    Regular rhythm and normal rate, normal S1 and S2, no murmurs,gallops, rubs, or clicks. No distinct PMI noted.    Lungs:     Clear to auscultation bilaterally, respirations regular, even and unlabored    Abdomen:     Soft nontender, nondistended, normal bowel sounds   Extremities:   No gross deformities, no edema, clubbing, or cyanosis.    Pulses:   Peripheral pulses palpable and equal bilaterally.     Barbaeu Test:  Left: Abnormal:  (oxymetric Allens) Right: Normal     Labs and Diagnostic Data:  Results from last 7 days   Lab Units 07/13/22  0719   SODIUM mmol/L 139   POTASSIUM mmol/L 3.9   CHLORIDE mmol/L 104   CO2 mmol/L 28.0   BUN mg/dL 16   CREATININE mg/dL 0.94   GLUCOSE mg/dL 89   CALCIUM mg/dL 9.0     Results from last 7 days   Lab Units 07/13/22  0719   WBC 10*3/mm3 5.59   HEMOGLOBIN g/dL 14.7   HEMATOCRIT % 43.4   PLATELETS 10*3/mm3 204     Lab Results   Component Value Date    CHOL 125 07/13/2022    TRIG 67 07/13/2022    HDL 44 07/13/2022    LDL 67 07/13/2022    AST 16 07/13/2022    ALT 12 07/13/2022                   EKG/Tele: Normal sinus rhythm    IMPRESSION:  · Patient is a 71-year-old male with history of syncope and recent history of presyncope who had an abnormal stress test revealing a small area of hypoperfusion in the apex as well as a 6-second run of V. tach on cardiac monitor.  He presents today for left heart catheterization +/- catheter-based intervention.    PLAN:  · Procedure to perform: LHC +/- CBI. Risks, benefits and alternatives to the procedure explained to the patient and he understands and wishes to proceed.       Rina Redman PA-C  "

## 2022-08-04 ENCOUNTER — OFFICE VISIT (OUTPATIENT)
Dept: CARDIOLOGY | Facility: CLINIC | Age: 72
End: 2022-08-04

## 2022-08-04 VITALS
WEIGHT: 178.2 LBS | SYSTOLIC BLOOD PRESSURE: 110 MMHG | HEIGHT: 72 IN | DIASTOLIC BLOOD PRESSURE: 70 MMHG | BODY MASS INDEX: 24.14 KG/M2 | HEART RATE: 68 BPM

## 2022-08-04 DIAGNOSIS — R55 SYNCOPE AND COLLAPSE: ICD-10-CM

## 2022-08-04 DIAGNOSIS — E78.5 DYSLIPIDEMIA: Primary | ICD-10-CM

## 2022-08-04 DIAGNOSIS — I47.20 VT (VENTRICULAR TACHYCARDIA): ICD-10-CM

## 2022-08-04 DIAGNOSIS — I95.1 ORTHOSTATIC HYPOTENSION: ICD-10-CM

## 2022-08-04 PROCEDURE — 99214 OFFICE O/P EST MOD 30 MIN: CPT | Performed by: NURSE PRACTITIONER

## 2022-08-04 NOTE — PROGRESS NOTES
Chief Complaint   Patient presents with   • Follow-up     Cardiac management     • Cardiac cath     Had on 7/13/22 per Dr Plaza. He reports Metoprolol and amlodipine was stopped.   • Syncope     Has had no further episodes noticed.   • Lab     Last labs in chart.   • Med Refill     PCP writes refills.     Brandon Bergman is a 71 y.o. male with no significant past cardiac history other than mild hyperlipidemia treated with simvastatin.  Remote cardiac cath 10-15 years ago reported as minor nonobstructive disease.  He had GI bleed in 2019, diagnosed with gastric ulcer.  He has not taken aspirin since then.  Echo at that time reported as normal.       He was referred back for evaluation of recurrent syncope. The first episode occurred in 2019 but no formal work-up done.  Had another episode in October 2020 when he was driving to [place].  He developed a funny feeling in the lower part of his chest radiating upward.  Everything went dark and felt he would pass out. He pulled over to the side and lost consciousness for few seconds.  He was then referred to another cardiologist where he wore a cardiac event monitor x 14 days showing sinus with few PVCs, no VT, no pause. MUGA scan showed normal EF.  Treadmill stress test showed no EKG changes with exercise.  He did not return for follow-up.     He had a similar episode of near syncope on 6/15/2022 which occurred while driving.  He was seen in [facility] ED describing similar prodrome.  He was driving, felt his heart flutter with mild pressure and again felt he would pass out but he did not.  EKG NSR, CBC, CMP, mag and troponin normal. CTA head and neck showed no carotid or vertebral stenosis.  30-day MCOT placed on 6/16/2022, Multiple runs of VT noted. Toprol XL 25 mg started. Referral placed to Dr. Medina.  Nuclear stress showed small apical ischemia. Amlodipine 2.5 mg added for HTN response. Cardiac cath at [facility] showed no significant lesions. Toprol and amlodipine  stopped, did not tolerate due to hypotension.     He returns today for cath follow up. No further presyncope or syncopal episodes. MCOT report (per Luis Alfredo Albert NP) remains pending. Labs 7/13/22: , Tri 67, HDL 44, LDL 67, CBC, CMP normal, mag 2.1. No TSH.          Cardiac History:    Past Surgical History:   Procedure Laterality Date   • CARDIAC CATHETERIZATION Left 07/13/2022    - No sig lesions   • CARDIOVASCULAR STRESS TEST  07/06/2022    7 Min.30 Sec.10.1 METS. 90% THR. 171/88. EF 65%. Small apical Ischemia   • CONVERTED (HISTORICAL) HOLTER  06/16/2022    MCOT (pending) multiple runs of VT, longest 6 seconds   • CONVERTED (HISTORICAL) HOLTER  12/02/2020    14-day-53/140, few PVCs, no VT, no pauses   • ECHO - CONVERTED  06/27/2022    ([place]) EF 60%, normal LV, LA, no valvular pathology   • ECHO - CONVERTED  09/23/2019    ([facility]) EF 60%, mild MR, RVSP 40 mmHg   • ENDOSCOPY N/A 09/26/2019    Procedure: ESOPHAGOGASTRODUODENOSCOPY;  Surgeon: Brunner, Mark I, MD;  Location: [facility] ENDOSCOPY;  Service: Gastroenterology   • OTHER SURGICAL HISTORY  12/04/2020    Muga-EF 56%     Current Outpatient Medications   Medication Sig Dispense Refill   • fluticasone (FLONASE) 50 MCG/ACT nasal spray 2 sprays into the nostril(s) as directed by provider Daily As Needed for Rhinitis.     • levocetirizine (XYZAL) 5 MG tablet Take 5 mg by mouth Every Evening.     • Multiple Vitamins-Minerals (MULTIVITAL PO) Take  by mouth Daily.     • Omega-3 Fatty Acids (FISH OIL) 1000 MG capsule capsule Take  by mouth Daily With Breakfast.     • pantoprazole (PROTONIX) 40 MG EC tablet Take 40 mg by mouth Daily.     • simvastatin (ZOCOR) 10 MG tablet Take 10 mg by mouth Every Night.     • SUMAtriptan (IMITREX) 100 MG tablet Take one tablet at onset of headache. May repeat dose one time in 2 hours if headache not relieved. (Patient taking differently: Take one tablet at onset of headache. May repeat dose one time in 2 hours if headache  not relieved. Patient states he uses about once every 6 months.) 12 tablet 2   • vitamin C (ASCORBIC ACID) 250 MG tablet Take 250 mg by mouth Daily.     • Vitamin D, Cholecalciferol, (CHOLECALCIFEROL) 400 units tablet Take  by mouth Daily.       No current facility-administered medications for this visit.     Aspirin and Codeine    Past Medical History:   Diagnosis Date   • Anxiety    • AR (allergic rhinitis)    • CAD (coronary artery disease)    • GERD (gastroesophageal reflux disease)      Social History     Socioeconomic History   • Marital status:    Tobacco Use   • Smoking status: Former Smoker     Packs/day: 1.00     Years: 10.00     Pack years: 10.00     Types: Cigarettes     Quit date: 1987     Years since quittin.8   • Smokeless tobacco: Never Used   Vaping Use   • Vaping Use: Never used   Substance and Sexual Activity   • Alcohol use: No   • Drug use: Never   • Sexual activity: Defer     Family History   Problem Relation Age of Onset   • Alzheimer's disease Mother    • Hypertension Mother    • Heart disease Father    • Alzheimer's disease Father    • Hypertension Father    • Coronary artery disease Father    • Cancer Sister    • Dementia Sister    • No Known Problems Brother    • No Known Problems Maternal Grandmother    • Stroke Maternal Grandfather    • Diabetes Paternal Grandmother    • Cancer Paternal Grandmother    • No Known Problems Paternal Grandfather    • No Known Problems Daughter    • No Known Problems Daughter      Review of Systems   Constitutional: Negative for decreased appetite and malaise/fatigue.   HENT: Negative.    Eyes: Negative for blurred vision.   Cardiovascular: Negative for chest pain, dyspnea on exertion, leg swelling, palpitations and syncope.   Respiratory: Negative for shortness of breath and sleep disturbances due to breathing.    Endocrine: Negative.    Hematologic/Lymphatic: Negative for bleeding problem. Does not bruise/bleed easily.   Skin: Negative.   "  Musculoskeletal: Negative for falls and myalgias.   Gastrointestinal: Negative for abdominal pain, heartburn and melena.   Genitourinary: Negative for hematuria.   Neurological: Negative for dizziness and light-headedness.   Psychiatric/Behavioral: Negative for altered mental status.   Allergic/Immunologic: Negative.       Objective     /70 (BP Location: Right arm)   Pulse 68   Ht 182.9 cm (72.01\")   Wt 80.8 kg (178 lb 3.2 oz)   BMI 24.16 kg/m²     Vitals and nursing note reviewed.   Constitutional:       General: Not in acute distress.     Appearance: Well-developed. Not diaphoretic.   Eyes:      Pupils: Pupils are equal, round, and reactive to light.   HENT:      Head: Normocephalic.   Pulmonary:      Effort: Pulmonary effort is normal. No respiratory distress.      Breath sounds: Normal breath sounds.   Cardiovascular:      Normal rate. Regular rhythm.   Pulses:     Intact distal pulses.   Abdominal:      General: Bowel sounds are normal.      Palpations: Abdomen is soft.   Musculoskeletal: Normal range of motion.      Cervical back: Normal range of motion. Skin:     General: Skin is warm and dry.   Neurological:      Mental Status: Alert and oriented to person, place, and time.        Procedures          Problem List Items Addressed This Visit        Cardiac and Vasculature    Orthostatic hypotension    VT (ventricular tachycardia) (HCC)    Overview     Added automatically from request for surgery [id number]         Dyslipidemia - Primary       Symptoms and Signs    Syncope and collapse         1. VT- in the setting of normal cardiac cath, normal LV function, normal labs.  ?Dehydration.  Did not tolerate beta blocker. EP consult pending.     2. Syncope- episodic, two years apart. Arrhythmia vs hypotension? No recurrent syncope since June.     3. Hyperlipidemia- well controlled with low dose simvastatin.     4. Hypotension with beta blocker and low dose CCB. He is on no antihypertensives f rhythm control " medications due to intolerance.     Appointment with Dr. Medina on 9/19/22. If he becomes symptomatic prior, advised to contact office or present to ED here or [place]. FU in 6 months.     BMI is within normal parameters. No other follow-up for BMI required.               Electronically signed by GAUTAM Beltrán,  August 7, 2022 18:39 EDT

## 2022-08-07 PROBLEM — E78.5 DYSLIPIDEMIA: Status: ACTIVE | Noted: 2022-08-07

## 2022-08-07 PROBLEM — I42.0 CARDIOMYOPATHY, DILATED: Status: RESOLVED | Noted: 2020-12-02 | Resolved: 2022-08-07

## 2022-08-08 PROCEDURE — 93228 REMOTE 30 DAY ECG REV/REPORT: CPT | Performed by: INTERNAL MEDICINE

## 2022-09-19 ENCOUNTER — OFFICE VISIT (OUTPATIENT)
Dept: CARDIOLOGY | Facility: CLINIC | Age: 72
End: 2022-09-19

## 2022-09-19 VITALS
HEART RATE: 80 BPM | OXYGEN SATURATION: 98 % | SYSTOLIC BLOOD PRESSURE: 108 MMHG | WEIGHT: 180 LBS | DIASTOLIC BLOOD PRESSURE: 64 MMHG | HEIGHT: 72 IN | BODY MASS INDEX: 24.38 KG/M2

## 2022-09-19 DIAGNOSIS — R55 SYNCOPE AND COLLAPSE: ICD-10-CM

## 2022-09-19 DIAGNOSIS — I47.20 VT (VENTRICULAR TACHYCARDIA): Primary | ICD-10-CM

## 2022-09-19 PROCEDURE — 93000 ELECTROCARDIOGRAM COMPLETE: CPT | Performed by: NURSE PRACTITIONER

## 2022-09-19 PROCEDURE — 99204 OFFICE O/P NEW MOD 45 MIN: CPT | Performed by: INTERNAL MEDICINE

## 2022-09-19 NOTE — PROGRESS NOTES
Cardiac Electrophysiology Outpatient Consult Note            [facility] at [facility]    Consult Note     Marlon Bergman  [id number]  09/19/2022  [phone]     Primary Care Physician: Baldemar Espino MD    Referred By: No ref. provider found    Subjective     Chief Complaint:   Diagnoses and all orders for this visit:    1. VT (ventricular tachycardia) ([facility]) (Primary)    2. Bradycardia, sinus    3. Syncope and collapse      Chief Complaint   Patient presents with   • VT (ventricular tachycardia)     History of Present Illness:   Marlon Bergman is a 71 y.o. male who presents to my electrophysiology clinic for evaluation of NSVT. Patient has had recurrent syncope. He has had one episode of alcides syncope and two episodes of near syncope since October 2020. Prodrome of epigastric sensation, flushed and tunnel vision. Alcides syncope was witnessed. No seizure like activity. No post ictal phase. All three events were while sitting. Workup has included echocardiograms, MCOT x 2 and stress test. Recent MCOT revealed 6.2 seconds of NSVT at ~03:00. Patient had no symptoms with this event. He had a MPS to evaluate for ischemic causes. MPS was abnormal as detailed above. Subsequently, he underwent LHC which revealed near normal coronaries. He has subsequently been referred to our clinic for evaluation of NSVT.     Problem List:  1. Ventricular tachycardia   1. MCOT, 6/16/2022: Monitored 17 days. VT 6.2 sec. SR with PACs/artifact.   2. MPS, 7/6/2022: EF 65%. Small area of hypoperfusion involving the apex seen during the stress improved at rest. Small apical ischemia noted. Average exercise tolerance. Mild increase in chronotropic response. Hypertensive BP response. NSST-T changes seen.   2. Syncope  1. Echocardiogram, 9/23/2019: EF 60%. Mild MR. Mild PHTN. Suboptimal study.   2. Echocardiogram, 11/2/2020: EF 45±5%. Mild concentric LVH. Grade I diastolic dysfunction. Mild LAE. Borderline LOIS.  Technically limited study.  Only subcostal views are readily interpretable.   3. GXT, 11/2/2020: Exercised 9:20 and stopped due to SOB. <1/2 mm ST segment depression ~1 to 2 min into recovery. Low risk study demonstrating very good to excellent functional capacity without symptoms of ischemia or diagnostic EKG changes.   4. MUGA, 12/4/2020 ([facility]): EF 56%.   5. Echocardiogram, 6/27/2022: EF 56-60%. Borderline concentric LVH. Technically poor quality echo.   6. MCOT, 11/3/2020: Monitored 14 days. NSR. PVCs. Controlled rate 88% of the time.   7. CTA Neck, 6/15/2022: Patent bilateral carotid arteries. No high-grade stenosis. Mild atherosclerotic calcification at proximal left ICA. Left sphenoid sinus disease. Patent major intracranial vasculature without findings of large vessel occlusion.   8. MCOT, 6/16/2022: Monitored 17 days. VT 6.2 sec. SR with PACs/artifact.   9. Echocardiogram, 6/27/2022: EF 56-60%. Borderline concentric LVH. Technically poor quality echo.   3. False positive stress test  1. MPS 7/6/22: mild increase in chronotropic response, non specific ST to T changes, EF 65%, small area of hypoperfusion involving the apex, better with rest.   2. LHC 7/13/22: EF 60%, near normal cors.   4. Hyperlipidemia  5. Anemia    Review of Systems:   As per HPI. All other negative except for as noted in HPI.     Past Medical History:   Past Medical History:   Diagnosis Date   • Anxiety    • AR (allergic rhinitis)    • CAD (coronary artery disease)    • GERD (gastroesophageal reflux disease)        Past Surgical History:   Past Surgical History:   Procedure Laterality Date   • CARDIAC CATHETERIZATION Left 07/13/2022    - No sig lesions   • CARDIOVASCULAR STRESS TEST  07/06/2022    7 Min.30 Sec.10.1 METS. 90% THR. 171/88. EF 65%. Small apical Ischemia   • CONVERTED (HISTORICAL) HOLTER  06/16/2022    MCOT (pending) multiple runs of VT, longest 6 seconds   • CONVERTED (HISTORICAL) HOLTER  12/02/2020    14-day-53/140,  few PVCs, no VT, no pauses   • ECHO - CONVERTED  2022    ([place]) EF 60%, normal LV, LA, no valvular pathology   • ECHO - CONVERTED  2019    ([facility]) EF 60%, mild MR, RVSP 40 mmHg   • ENDOSCOPY N/A 2019    Procedure: ESOPHAGOGASTRODUODENOSCOPY;  Surgeon: Brunner, Mark I, MD;  Location: [facility] ENDOSCOPY;  Service: Gastroenterology   • OTHER SURGICAL HISTORY  2020    Muga-EF 56%       Family History:   Family History   Problem Relation Age of Onset   • Alzheimer's disease Mother    • Hypertension Mother    • Heart disease Father    • Alzheimer's disease Father    • Hypertension Father    • Coronary artery disease Father    • Cancer Sister    • Dementia Sister    • No Known Problems Brother    • No Known Problems Maternal Grandmother    • Stroke Maternal Grandfather    • Diabetes Paternal Grandmother    • Cancer Paternal Grandmother    • No Known Problems Paternal Grandfather    • No Known Problems Daughter    • No Known Problems Daughter        Social History:   Social History     Socioeconomic History   • Marital status:    Tobacco Use   • Smoking status: Former Smoker     Packs/day: 1.00     Years: 10.00     Pack years: 10.00     Types: Cigarettes     Quit date: 1987     Years since quittin.0   • Smokeless tobacco: Never Used   Vaping Use   • Vaping Use: Never used   Substance and Sexual Activity   • Alcohol use: No   • Drug use: Never   • Sexual activity: Defer       Medications:     Current Outpatient Medications:   •  fluticasone (FLONASE) 50 MCG/ACT nasal spray, 2 sprays into the nostril(s) as directed by provider Daily As Needed for Rhinitis., Disp: , Rfl:   •  levocetirizine (XYZAL) 5 MG tablet, Take 5 mg by mouth Every Evening., Disp: , Rfl:   •  Multiple Vitamins-Minerals (MULTIVITAL PO), Take 1 tablet by mouth Daily., Disp: , Rfl:   •  Omega-3 Fatty Acids (FISH OIL) 1000 MG capsule capsule, Take 1,000 mg by mouth Daily With Breakfast., Disp: , Rfl:   •  pantoprazole  "(PROTONIX) 40 MG EC tablet, Take 40 mg by mouth Daily., Disp: , Rfl:   •  simvastatin (ZOCOR) 10 MG tablet, Take 10 mg by mouth Every Night., Disp: , Rfl:   •  SUMAtriptan (IMITREX) 100 MG tablet, Take one tablet at onset of headache. May repeat dose one time in 2 hours if headache not relieved. (Patient taking differently: Take one tablet at onset of headache. May repeat dose one time in 2 hours if headache not relieved. Patient states he uses about once every 6 months.), Disp: 12 tablet, Rfl: 2  •  vitamin C (ASCORBIC ACID) 250 MG tablet, Take 250 mg by mouth Daily., Disp: , Rfl:   •  Vitamin D, Cholecalciferol, (CHOLECALCIFEROL) 400 units tablet, Take 400 Units by mouth Daily., Disp: , Rfl:     Allergies:   Allergies   Allergen Reactions   • Aspirin Other (See Comments)     Stomach ulcers   • Codeine Nausea And Vomiting       Objective   Vital Signs:   Vitals:    09/19/22 1054   BP: 108/64   BP Location: Left arm   Patient Position: Sitting   Pulse: 80   SpO2: 98%   Weight: 81.6 kg (180 lb)   Height: 182.9 cm (72\")       PHYSICAL EXAM  General appearance: Awake, alert, cooperative  Head: Normocephalic, without obvious abnormality, atraumatic  Eyes: Conjunctivae/corneas clear, EOMs intact  Neck: no adenopathy, no carotid bruit, no JVD and thyroid: not enlarged  Lungs: clear to auscultation bilaterally and no rhonchi or crackles\", ' symmetric  Heart: regular rate and rhythm, S1, S2 normal, no murmur, click, rub or gallop  Abdomen: Soft, non-tender, bowel sounds normal,  no organomegaly  Extremities: extremities normal, atraumatic, no cyanosis or edema  Skin: Skin color, turgor normal, no rashes or lesions  Neurologic: Grossly normal     Lab Results   Component Value Date    GLUCOSE 89 07/13/2022    CALCIUM 9.0 07/13/2022     07/13/2022    K 3.9 07/13/2022    CO2 28.0 07/13/2022     07/13/2022    BUN 16 07/13/2022    CREATININE 0.94 07/13/2022    EGFRIFNONA 101 09/26/2019    BCR 17.0 07/13/2022    " ANIONGAP 7.0 07/13/2022     Lab Results   Component Value Date    WBC 5.59 07/13/2022    HGB 14.7 07/13/2022    HCT 43.4 07/13/2022    MCV 95.4 07/13/2022     07/13/2022     No results found for: INR, PROTIME  Lab Results   Component Value Date    TSH 6.120 (H) 09/26/2019       Cardiac Testing:      I personally viewed and interpreted the patient's EKG/Telemetry/lab data      ECG 12 Lead    Date/Time: 9/19/2022 11:18 AM  Performed by: Thu Francis APRN  Authorized by: Thu Francis APRN   Comparison: compared with previous ECG from 7/5/2022  Rhythm: sinus rhythm  BPM: 75  Other findings: non-specific ST-T wave changes    Clinical impression: abnormal EKG          Tobacco Cessation: N/A  Obstructive Sleep Apnea Screening: Completed    Assessment & Plan    Diagnoses and all orders for this visit:    1. VT (ventricular tachycardia) (HCC) (Primary)    2. Bradycardia, sinus    3. Syncope and collapse         Diagnosis Plan   1. VT (ventricular tachycardia) (HCC)  6 seconds of NSVT during sleeping hours, asymptomatic.   Normal echo and near normal cors on [facility].   Per his wife who stays up late at night many hours after he falls asleep, he never snores, he never stops breathing, he sleeps well.    Patient feels rested in the morning.  He has no headaches.  He has no features consistent with sleep apnea.  I think we can defer further work-up for sleep apnea.   3. Syncope and collapse   quite consistent with vasovagal syncope as a mechanism. Not explained by brief NSVT. Echo, CTA neck and MCOT without source for these events.   Offered loop recorder. He will think about this.      Body mass index is 24.41 kg/m².    I spent 51 minutes in consultation with this patient which included more than 65% of this time in direct face-to-face counseling, physical examination and discussion of my assessment and findings and shared decision making with the patient.  The remainder of the time not spent face to  face was performing one, some or all of the following actions:  preparing to see this patient ( eg. Review of tests),  ordering medications, tests or procedures ), care coordination, discussion of the plan with other healthcare providers, documenting clinical information in Epic well as independently interpreting results and communicating results to patient, family and or caregiver.  All time noted occurred on the date of service.    Follow Up:       Thank you for allowing me to participate in the care of your patient. Please to not hesitate to contact me with additional questions or concerns.        Scribed for Rafael Medina MD by ANUSHKA Francis, APRN. 9/19/2022  11:15 EDT

## 2022-09-27 DIAGNOSIS — I47.20 VT (VENTRICULAR TACHYCARDIA): ICD-10-CM

## 2022-09-27 DIAGNOSIS — R00.1 BRADYCARDIA, SINUS: Primary | ICD-10-CM

## 2022-10-04 ENCOUNTER — PREP FOR SURGERY (OUTPATIENT)
Dept: OTHER | Facility: HOSPITAL | Age: 72
End: 2022-10-04

## 2022-10-04 DIAGNOSIS — I47.20 VT (VENTRICULAR TACHYCARDIA): Primary | ICD-10-CM

## 2022-10-04 RX ORDER — ACETAMINOPHEN 325 MG/1
650 TABLET ORAL EVERY 4 HOURS PRN
Status: CANCELLED | OUTPATIENT
Start: 2022-10-04

## 2022-10-04 RX ORDER — NITROGLYCERIN 0.4 MG/1
0.4 TABLET SUBLINGUAL
Status: CANCELLED | OUTPATIENT
Start: 2022-10-04

## 2022-10-11 ENCOUNTER — HOSPITAL ENCOUNTER (OUTPATIENT)
Dept: CARDIOLOGY | Facility: HOSPITAL | Age: 72
Discharge: HOME | End: 2022-10-11
Attending: INTERNAL MEDICINE | Admitting: INTERNAL MEDICINE

## 2022-10-11 VITALS
BODY MASS INDEX: 24.22 KG/M2 | TEMPERATURE: 97.6 F | DIASTOLIC BLOOD PRESSURE: 79 MMHG | WEIGHT: 178.79 LBS | SYSTOLIC BLOOD PRESSURE: 128 MMHG | OXYGEN SATURATION: 99 % | HEART RATE: 60 BPM | HEIGHT: 72 IN | RESPIRATION RATE: 16 BRPM

## 2022-10-11 DIAGNOSIS — R00.1 BRADYCARDIA, SINUS: ICD-10-CM

## 2022-10-11 DIAGNOSIS — I47.20 VT (VENTRICULAR TACHYCARDIA): ICD-10-CM

## 2022-10-11 PROBLEM — R94.39 ABNORMAL NUCLEAR STRESS TEST: Status: RESOLVED | Noted: 2022-07-07 | Resolved: 2022-10-11

## 2022-10-11 PROBLEM — R06.02 SHORTNESS OF BREATH: Status: RESOLVED | Noted: 2020-10-27 | Resolved: 2022-10-11

## 2022-10-11 PROBLEM — K92.2 GI BLEED: Status: RESOLVED | Noted: 2019-09-26 | Resolved: 2022-10-11

## 2022-10-11 PROBLEM — D64.9 SYMPTOMATIC ANEMIA: Status: RESOLVED | Noted: 2019-09-25 | Resolved: 2022-10-11

## 2022-10-11 PROBLEM — K92.1 MELENA: Status: RESOLVED | Noted: 2019-09-25 | Resolved: 2022-10-11

## 2022-10-11 PROBLEM — I95.1 ORTHOSTATIC HYPOTENSION: Status: RESOLVED | Noted: 2019-09-25 | Resolved: 2022-10-11

## 2022-10-11 PROBLEM — Z91.89 AT RISK FOR OBSTRUCTIVE SLEEP APNEA: Status: ACTIVE | Noted: 2022-10-11

## 2022-10-11 PROBLEM — D62 ACUTE BLOOD LOSS ANEMIA: Status: RESOLVED | Noted: 2019-09-26 | Resolved: 2022-10-11

## 2022-10-11 LAB
MAXIMAL PREDICTED HEART RATE: 150 BPM
STRESS TARGET HR: 128 BPM

## 2022-10-11 PROCEDURE — 33285 INSJ SUBQ CAR RHYTHM MNTR: CPT | Performed by: INTERNAL MEDICINE

## 2022-10-11 PROCEDURE — S0260 H&P FOR SURGERY: HCPCS | Performed by: INTERNAL MEDICINE

## 2022-10-11 PROCEDURE — 33285 INSJ SUBQ CAR RHYTHM MNTR: CPT

## 2022-10-11 PROCEDURE — C1764 EVENT RECORDER, CARDIAC: HCPCS

## 2022-10-11 RX ORDER — ACETAMINOPHEN 325 MG/1
650 TABLET ORAL EVERY 4 HOURS PRN
Status: DISCONTINUED | OUTPATIENT
Start: 2022-10-11 | End: 2022-10-11 | Stop reason: HOSPADM

## 2022-10-11 RX ORDER — NITROGLYCERIN 0.4 MG/1
0.4 TABLET SUBLINGUAL
Status: DISCONTINUED | OUTPATIENT
Start: 2022-10-11 | End: 2022-10-11 | Stop reason: HOSPADM

## 2022-10-11 NOTE — INTERVAL H&P NOTE
H&P reviewed. The patient was examined and there are no changes to the H&P.        EP Pre-Procedure Report  Cardiovascular Laboratory  [facility]    Patient:  Marlon Bergman  :  1950  PCP:  Baldemar Espino MD  PHONE:  [phone]    DATE: 10/11/2022      MEDICATIONS:  Prior to Admission medications    Medication Sig Start Date End Date Taking? Authorizing Provider   fluticasone (FLONASE) 50 MCG/ACT nasal spray 2 sprays into the nostril(s) as directed by provider Daily As Needed for Rhinitis.   Yes Mehrdad Farias MD   levocetirizine (XYZAL) 5 MG tablet Take 5 mg by mouth Every Evening.   Yes Mehrdad Farias MD   Multiple Vitamins-Minerals (MULTIVITAL PO) Take 1 tablet by mouth Daily.   Yes Mehrdad Farias MD   Omega-3 Fatty Acids (FISH OIL) 1000 MG capsule capsule Take 1,000 mg by mouth Daily With Breakfast.   Yes Mehrdad Farias MD   pantoprazole (PROTONIX) 40 MG EC tablet Take 40 mg by mouth Daily.   Yes Mehrdad Farias MD   simvastatin (ZOCOR) 10 MG tablet Take 10 mg by mouth Every Night.   Yes Mehrdad Farias MD   vitamin C (ASCORBIC ACID) 250 MG tablet Take 250 mg by mouth Daily.   Yes Mehrdad Farias MD   Vitamin D, Cholecalciferol, (CHOLECALCIFEROL) 400 units tablet Take 400 Units by mouth Daily.   Yes Mehrdad Farias MD   SUMAtriptan (IMITREX) 100 MG tablet Take one tablet at onset of headache. May repeat dose one time in 2 hours if headache not relieved.  Patient taking differently: Take one tablet at onset of headache. May repeat dose one time in 2 hours if headache not relieved. Patient states he uses about once every 6 months. 10/28/19   Yasmany Adkins MD       Past medical & surgical history, social and family history reviewed in the electronic medical record.    Physical Exam:    Vitals:   Vitals:    10/11/22 1020   BP: 128/79   Pulse: 60   Resp: 16   Temp: 97.6 °F (36.4 °C)   SpO2: 99%          10/11/22  1020   Weight:  81.1 kg (178 lb 12.7 oz)   Body mass index is 24.25 kg/m².    GENERAL: No apparent distress.  No significant changes since last exam.  CHEST: Clear to auscultation bilaterally no stridor no wheeze.  CV: S1, S2, Regular without Murmurs, Rubs or Gallops  EXTREMITIES: No edema.                CrCl cannot be calculated (Patient's most recent lab result is older than the maximum 30 days allowed.).    IMPRESSION:syncope and collapse      PLAN:  Procedure to perform: Loop recorder        Electronically signed by LAINEY Arias, 10/11/22, 11:31 AM EDT.

## 2022-10-11 NOTE — OP NOTE
Cardiac Electrophysiology Procedure Note          [facility]    PROCEDURE: CARDIAC MEMORY LOOP RECORDER IMPLANTATION (Implanted Loop Monitor or ILR)    OPERATION PERFORMED: Implantation of a [employer] cardiac memory loop recorder with serial number [id number] at the anterior chest wall.    ATTENDING SURGEON: Rafael Medina DO    ANESTHESIA: local anesthesia with lidocaine and bupivicaine were administered subcutaneously to the implant area.     ESTIMATED BLOOD LOSS: minimal (less than 5 cc)    COMPLICATIONS: None.  TIME OUT: Time out was completed with verification of the correct patient identity, procedure to be performed, procedure site and implanted equipment.    INDICATION FOR PROCEDURE:  Briefly, the patient is jake Mason Bergman is a 71 y.o. year old male with a history of unexplained syncope.  The patient was seen and examined by an electrophysiology staff physician and deemed appropriate for implantation of a memory loop recorder.    The patient was able to give written informed consent after revisiting the key portions of the risk versus benefit profile of the procedure.  This discussion was framed by our lengthy conversations  (please see our detailed notes).  Patient verbalized strong understanding of this discussion and a strong desire to proceed with the procedure.  Please note that this detailed informed consent process utilized mutual and shared decision making process between all parties involved, principally the physician and patient, but also potentially with input from the patient's selected family and friends.    PROCEDURE AND FINDINGS:  The patient was brought to the electrophysiology suite.  Informed consent was obtained prior to the procedure and confirmed.  The site of implantation was prepped and draped in the usual sterile fashion.   Adequate local anesthesia was given: the skin at the site of implantation was infiltrated with 1% lidocaine and 0.5%  bupivicaine 50/50 mixture.    The ILR was percutaneously injected into the subcutaneous space using the supplied implant tools in routine fashion.  The wound was closed with surgical adhesive and a sterile occlusive dressing.    CONCLUSION:  Successful implantation of cardiac memory loop recorder system.      FOLLOW UP AND RECOMMENDATIONS:  The patient may remove the dressing later today.  The patient may shower later today.  No wound check is required.   The patient will follow up with me

## 2022-10-17 DIAGNOSIS — Z91.89 AT RISK FOR OBSTRUCTIVE SLEEP APNEA: Primary | ICD-10-CM

## 2022-11-30 PROCEDURE — G2066 INTER DEVC REMOTE 30D: HCPCS | Performed by: INTERNAL MEDICINE

## 2022-11-30 PROCEDURE — 93298 REM INTERROG DEV EVAL SCRMS: CPT | Performed by: INTERNAL MEDICINE

## 2022-12-31 PROCEDURE — G2066 INTER DEVC REMOTE 30D: HCPCS | Performed by: INTERNAL MEDICINE

## 2022-12-31 PROCEDURE — 93298 REM INTERROG DEV EVAL SCRMS: CPT | Performed by: INTERNAL MEDICINE

## 2023-02-16 ENCOUNTER — OFFICE VISIT (OUTPATIENT)
Dept: CARDIOLOGY | Facility: CLINIC | Age: 73
End: 2023-02-16
Payer: MEDICARE

## 2023-02-16 VITALS
BODY MASS INDEX: 24.73 KG/M2 | SYSTOLIC BLOOD PRESSURE: 106 MMHG | DIASTOLIC BLOOD PRESSURE: 68 MMHG | HEIGHT: 72 IN | HEART RATE: 76 BPM | WEIGHT: 182.6 LBS

## 2023-02-16 DIAGNOSIS — I47.20 VT (VENTRICULAR TACHYCARDIA): Primary | ICD-10-CM

## 2023-02-16 DIAGNOSIS — R55 VASOVAGAL SYNCOPE: ICD-10-CM

## 2023-02-16 DIAGNOSIS — R00.1 BRADYCARDIA, SINUS: ICD-10-CM

## 2023-02-16 DIAGNOSIS — R55 SYNCOPE AND COLLAPSE: ICD-10-CM

## 2023-02-16 DIAGNOSIS — E78.5 DYSLIPIDEMIA: ICD-10-CM

## 2023-02-16 PROCEDURE — 99214 OFFICE O/P EST MOD 30 MIN: CPT | Performed by: NURSE PRACTITIONER

## 2023-02-16 NOTE — PROGRESS NOTES
Chief Complaint   Patient presents with   • Follow-up     Cardiac management   • Lab     Last labs  a month ago per PCP.   • Device check     Had loop recorder placed 10/11/22, last remote check 1/15/23.   • Palpitations     Has occasional flutter, nothing alarming.   • Med Refill     PCP writes refills.     Brandon Bergman is a 72 y.o. male with mild hyperlipidemia referred for evaluation of syncope, 2 episodes, one in 2019 and again in 2020. Both episodes occurred while sitting. Initially referred to another cardiologist. Holter showed sinus with few PVCs, no VT, no pause. MUGA scan showed normal EF.  Treadmill stress test showed no EKG changes with exercise.      He had a similar episode of near syncope on 6/15/2022 which occurred while driving.  He was seen in [facility] ED describing similar prodrome.  He was driving, felt his heart flutter with mild pressure and again felt he would pass out but he did not.  EKG NSR, CBC, CMP, mag and troponin normal. CTA head and neck showed no carotid or vertebral stenosis.  30-day MCOT placed on 6/16/2022, Multiple runs of VT noted. Toprol XL 25 mg started. Referral placed to Dr. Medina.  Nuclear stress showed small apical ischemia. Amlodipine 2.5 mg added for HTN response. Cardiac cath at [facility] showed no significant lesions. Toprol and amlodipine stopped, did not tolerate due to hypotension. He was referred to Dr. Medina who placed Loop recorder. He felt episodes may be vasovagal. Labs 7/13/22: , Tri 67, HDL 44, LDL 67, CBC, CMP normal, mag 2.1. A1C 5.1%.     He returns today for follow up. No further episodes. Remote device checks show no arrhythmia. Overall feeling well. Denies cardiac symptoms.        Cardiac History:    Past Surgical History:   Procedure Laterality Date   • CARDIAC CATHETERIZATION Left 07/13/2022    - No sig lesions   • CARDIOVASCULAR STRESS TEST  07/06/2022    7 Min.30 Sec.10.1 METS. 90% THR. 171/88. EF 65%. Small apical Ischemia   •  CONVERTED (HISTORICAL) HOLTER  06/16/2022    MCOT (pending) multiple runs of VT, longest 6 seconds   • CONVERTED (HISTORICAL) HOLTER  12/02/2020    14-day-53/140, few PVCs, no VT, no pauses   • ECHO - CONVERTED  06/27/2022    ([facility]) EF 60%, normal LV, LA, no valvular pathology   • ECHO - CONVERTED  09/23/2019    ([facility]) EF 60%, mild MR, RVSP 40 mmHg   • ENDOSCOPY N/A 09/26/2019    Procedure: ESOPHAGOGASTRODUODENOSCOPY;  Surgeon: Brunner, Mark I, MD;  Location: [facility] ENDOSCOPY;  Service: Gastroenterology   • OTHER SURGICAL HISTORY  12/04/2020    Muga-EF 56%   • OTHER SURGICAL HISTORY  10/11/2022    (Dr. Medina) [facility] Loop [place]     Current Outpatient Medications   Medication Sig Dispense Refill   • fluticasone (FLONASE) 50 MCG/ACT nasal spray 2 sprays into the nostril(s) as directed by provider Daily As Needed for Rhinitis.     • levocetirizine (XYZAL) 5 MG tablet Take 5 mg by mouth Every Evening.     • Multiple Vitamins-Minerals (MULTIVITAL PO) Take 1 tablet by mouth Daily.     • pantoprazole (PROTONIX) 40 MG EC tablet Take 40 mg by mouth Daily.     • simvastatin (ZOCOR) 10 MG tablet Take 10 mg by mouth Every Night.     • SUMAtriptan (IMITREX) 100 MG tablet Take one tablet at onset of headache. May repeat dose one time in 2 hours if headache not relieved. (Patient taking differently: Take one tablet at onset of headache. May repeat dose one time in 2 hours if headache not relieved. Patient states he uses about once every 6 months.) 12 tablet 2   • vitamin C (ASCORBIC ACID) 250 MG tablet Take 250 mg by mouth Daily.     • Vitamin D, Cholecalciferol, (CHOLECALCIFEROL) 400 units tablet Take 400 Units by mouth Daily.       No current facility-administered medications for this visit.     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     removed 1/31/23  "  -  Social History     Socioeconomic History   • Marital status:    Tobacco Use   • Smoking status: Former     Packs/day: 1.00     Years: 10.00     Pack years: 10.00     Types: Cigarettes     Quit date: 1987     Years since quittin.4   • Smokeless tobacco: Never   Vaping Use   • Vaping Use: Never used   Substance and Sexual Activity   • Alcohol use: No   • Drug use: Never   • Sexual activity: Defer     Family History   Problem Relation Age of Onset   • Alzheimer's disease Mother    • Hypertension Mother    • Heart disease Father    • Alzheimer's disease Father    • Hypertension Father    • Coronary artery disease Father    • Cancer Sister    • Dementia Sister    • No Known Problems Brother    • No Known Problems Maternal Grandmother    • Stroke Maternal Grandfather    • Diabetes Paternal Grandmother    • Cancer Paternal Grandmother    • No Known Problems Paternal Grandfather    • No Known Problems Daughter    • No Known Problems Daughter      Review of Systems   Constitutional: Positive for weight gain (+4). Negative for decreased appetite and malaise/fatigue.   HENT: Negative.    Eyes: Negative for blurred vision.   Cardiovascular: Negative for chest pain, dyspnea on exertion, leg swelling, palpitations and syncope.   Respiratory: Negative for shortness of breath and sleep disturbances due to breathing.    Endocrine: Negative.    Hematologic/Lymphatic: Negative for bleeding problem. Does not bruise/bleed easily.   Skin: Negative.    Musculoskeletal: Negative for falls and myalgias.   Gastrointestinal: Negative for abdominal pain, heartburn and melena.   Genitourinary: Negative for hematuria.   Neurological: Negative for dizziness and light-headedness.   Psychiatric/Behavioral: Negative for altered mental status.   Allergic/Immunologic: Negative.         Objective     /68 (BP Location: Left arm)   Pulse 76   Ht 182.9 cm (72.01\")   Wt 82.8 kg (182 lb 9.6 oz)   BMI 24.76 kg/m²     Vitals " and nursing note reviewed.   Constitutional:       General: Not in acute distress.     Appearance: Well-developed. Not diaphoretic.   Eyes:      Pupils: Pupils are equal, round, and reactive to light.   HENT:      Head: Normocephalic.   Pulmonary:      Effort: Pulmonary effort is normal. No respiratory distress.      Breath sounds: Normal breath sounds.   Cardiovascular:      Normal rate. Regular rhythm.   Pulses:     Intact distal pulses.   Abdominal:      General: Bowel sounds are normal.      Palpations: Abdomen is soft.   Musculoskeletal: Normal range of motion.      Cervical back: Normal range of motion. Skin:     General: Skin is warm and dry.   Neurological:      Mental Status: Alert and oriented to person, place, and time.        Procedures          Problem List Items Addressed This Visit        Cardiac and Vasculature    Bradycardia, sinus    VT (ventricular tachycardia) - Primary    Overview     1. MCOT, 6/16/2022: Monitored 17 days. VT 6.2 sec. SR with PACs/artifact.   2. MPS, 7/6/2022: EF 65%. Small area of hypoperfusion involving the apex seen during the stress improved at rest. Small apical ischemia noted. Average exercise tolerance. Mild increase in chronotropic response. Hypertensive BP response. NSST-T changes seen.          Dyslipidemia    Vasovagal syncope       Symptoms and Signs    Syncope and collapse    Overview       1. MUGA, 12/4/2020 ([facility]): EF 56%.   2. Echocardiogram, 6/27/2022: EF 56-60%. Borderline concentric LVH. Technically poor quality echo.   3. MCOT, 11/3/2020: Monitored 14 days. NSR. PVCs. Controlled rate 88% of the time.   4. CTA Neck, 6/15/2022: Patent bilateral carotid arteries. No high-grade stenosis. Mild atherosclerotic calcification at proximal left ICA. Left sphenoid sinus disease. Patent major intracranial vasculature without findings of large vessel occlusion.   5. MCOT, 6/16/2022: Monitored 17 days. VT 6.2 sec. SR with PACs/artifact.   6. Echocardiogram, 6/27/2022: EF  56-60%. Borderline concentric LVH. Technically poor quality echo.   7. Loop recorder implantation 10-11-22           1. VT- in the setting of normal cardiac cath, normal LV function, normal labs.  ?Dehydration, vasovagal syncope.  Did not tolerate beta blocker. EP placed Loop recorder with normal device checks.       2. Syncope- episodic, two years apart. Arrhythmia vs hypotension? No recurrent syncope since June 2022. Advised to push fluids. Wear compression socks, especially while driving long distance.      3. Hyperlipidemia- well controlled with low dose simvastatin.      4. Hypotension with beta blocker and low dose CCB. He is on no antihypertensives or rhythm control medications due to intolerance.      Will continue to monitor. FU in one year or sooner if needed.     BMI is within normal parameters. No other follow-up for BMI required.             Electronically signed by GAUTAM Beltrán,  February 22, 2023 09:17 EST

## 2023-02-22 PROBLEM — R55 VASOVAGAL SYNCOPE: Status: ACTIVE | Noted: 2023-02-22

## 2023-03-03 PROCEDURE — G2066 INTER DEVC REMOTE 30D: HCPCS | Performed by: INTERNAL MEDICINE

## 2023-03-03 PROCEDURE — 93298 REM INTERROG DEV EVAL SCRMS: CPT | Performed by: INTERNAL MEDICINE

## 2023-03-27 ENCOUNTER — OFFICE VISIT (OUTPATIENT)
Dept: CARDIOLOGY | Facility: CLINIC | Age: 73
End: 2023-03-27
Payer: MEDICARE

## 2023-03-27 VITALS
HEART RATE: 69 BPM | WEIGHT: 177 LBS | DIASTOLIC BLOOD PRESSURE: 64 MMHG | BODY MASS INDEX: 23.98 KG/M2 | OXYGEN SATURATION: 98 % | HEIGHT: 72 IN | SYSTOLIC BLOOD PRESSURE: 110 MMHG

## 2023-03-27 DIAGNOSIS — R00.1 BRADYCARDIA, SINUS: Primary | ICD-10-CM

## 2023-03-27 DIAGNOSIS — I47.20 VT (VENTRICULAR TACHYCARDIA): ICD-10-CM

## 2023-03-27 DIAGNOSIS — R55 VASOVAGAL SYNCOPE: ICD-10-CM

## 2023-03-27 DIAGNOSIS — Z91.89 AT RISK FOR OBSTRUCTIVE SLEEP APNEA: ICD-10-CM

## 2023-03-27 DIAGNOSIS — R55 SYNCOPE AND COLLAPSE: ICD-10-CM

## 2023-03-27 DIAGNOSIS — G47.33 OSA (OBSTRUCTIVE SLEEP APNEA): ICD-10-CM

## 2023-03-27 PROCEDURE — 93291 INTERROG DEV EVAL SCRMS IP: CPT | Performed by: INTERNAL MEDICINE

## 2023-03-27 PROCEDURE — 99214 OFFICE O/P EST MOD 30 MIN: CPT | Performed by: INTERNAL MEDICINE

## 2023-03-27 NOTE — PROGRESS NOTES
Cardiac Electrophysiology Outpatient Follow Up Note            [facility]    Follow Up Office Visit      Marlon Bergman  [id number]  03/27/2023  [unfilled]  [unfilled]    Primary Care Physician: Baldemar Espino MD    Referred By: No ref. provider found    Subjective     Chief Complaint:   Diagnoses and all orders for this visit:    1. Bradycardia, sinus (Primary)    2. Vasovagal syncope    3. VT (ventricular tachycardia)    4. At risk for obstructive sleep apnea    5. Syncope and collapse      Chief Complaint   Patient presents with   • ventricular tachycardia        History of Present Illness:   Marlon Bergman is a 72 y.o. male who presents to my electrophysiology clinic for follow up of above complaints.  Mr. Black tells me no further episodes of passing out.  He is actually felt pretty well recently.  No chest pain nausea vomit fevers or chills.      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 90% THR. 171/88. EF 65%. Small apical Ischemia   • CONVERTED (HISTORICAL) HOLTER  06/16/2022    MCOT (pending) multiple runs of VT, longest 6 seconds   • CONVERTED (HISTORICAL) HOLTER  12/02/2020    14-day-53/140, few PVCs, no VT, no pauses   • ECHO - CONVERTED  06/27/2022    ([facility]) EF 60%, normal LV, LA, no valvular pathology   • ECHO - CONVERTED  09/23/2019    ([facility]) EF 60%, mild MR, RVSP 40 mmHg   • ENDOSCOPY N/A 09/26/2019    Procedure: ESOPHAGOGASTRODUODENOSCOPY;  Surgeon: Brunner, Mark I, MD;  Location: [facility] ENDOSCOPY;  Service: Gastroenterology   •  OTHER SURGICAL HISTORY  2020    Muga-EF 56%   • OTHER SURGICAL HISTORY  10/11/2022    (Dr. Medina) [facility] Loop recorder       Family History:   Family History   Problem Relation Age of Onset   • Alzheimer's disease Mother    • Hypertension Mother    • Heart disease Father    • Alzheimer's disease Father    • Hypertension Father    • Coronary artery disease Father    • Cancer Sister    • Dementia Sister    • No Known Problems Brother    • No Known Problems Maternal Grandmother    • Stroke Maternal Grandfather    • Diabetes Paternal Grandmother    • Cancer Paternal Grandmother    • No Known Problems Paternal Grandfather    • No Known Problems Daughter    • No Known Problems Daughter        Social History:   Social History     Socioeconomic History   • Marital status:    Tobacco Use   • Smoking status: Former     Packs/day: 1.00     Years: 10.00     Pack years: 10.00     Types: Cigarettes     Quit date: 1987     Years since quittin.5   • Smokeless tobacco: Never   Vaping Use   • Vaping Use: Never used   Substance and Sexual Activity   • Alcohol use: No   • Drug use: Never   • Sexual activity: Defer       Medications:     Current Outpatient Medications:   •  fluticasone (FLONASE) 50 MCG/ACT nasal spray, 2 sprays into the nostril(s) as directed by provider Daily As Needed for Rhinitis., Disp: , Rfl:   •  levocetirizine (XYZAL) 5 MG tablet, Take 1 tablet by mouth Every Evening., Disp: , Rfl:   •  Multiple Vitamins-Minerals (MULTIVITAL PO), Take 1 tablet by mouth Daily., Disp: , Rfl:   •  pantoprazole (PROTONIX) 40 MG EC tablet, Take 1 tablet by mouth Daily., Disp: , Rfl:   •  simvastatin (ZOCOR) 10 MG tablet, Take 1 tablet by mouth Every Night., Disp: , Rfl:   •  SUMAtriptan (IMITREX) 100 MG tablet, Take one tablet at onset of headache. May repeat dose one time in 2 hours if headache not relieved. (Patient taking differently: Take one tablet at onset of headache. May repeat dose one time in 2 hours if  "headache not relieved. Patient states he uses about once every 6 months.), Disp: 12 tablet, Rfl: 2  •  vitamin C (ASCORBIC ACID) 250 MG tablet, Take 1 tablet by mouth Daily., Disp: , Rfl:   •  Vitamin D, Cholecalciferol, (CHOLECALCIFEROL) 400 units tablet, Take 1 tablet by mouth Daily., Disp: , Rfl:     Allergies:   Allergies   Allergen Reactions   • Aspirin Other (See Comments)     Stomach ulcers   • Codeine Nausea And Vomiting       Objective   Vital Signs:   Vitals:    03/27/23 1056   BP: 110/64   BP Location: Left arm   Patient Position: Sitting   Pulse: 69   SpO2: 98%   Weight: 80.3 kg (177 lb)   Height: 182.9 cm (72\")       PHYSICAL EXAM  General appearance: Awake, alert, cooperative  Head: Normocephalic, without obvious abnormality, atraumatic  Eyes: Conjunctivae/corneas clear, EOMs intact  Neck: no adenopathy, no carotid bruit, no JVD and thyroid: not enlarged  Lungs: clear to auscultation bilaterally and no rhonchi or crackles\", ' symmetric  Heart: regular rate and rhythm, S1, S2 normal, no murmur, click, rub or gallop  Abdomen: Soft, non-tender, bowel sounds normal,  no organomegaly  Extremities: extremities normal, atraumatic, no cyanosis or edema  Skin: Skin color, turgor normal, no rashes or lesions  Neurologic: Grossly normal     Lab Results   Component Value Date    GLUCOSE 89 07/13/2022    CALCIUM 9.0 07/13/2022     07/13/2022    K 3.9 07/13/2022    CO2 28.0 07/13/2022     07/13/2022    BUN 16 07/13/2022    CREATININE 0.94 07/13/2022    EGFRIFNONA 101 09/26/2019    BCR 17.0 07/13/2022    ANIONGAP 7.0 07/13/2022     Lab Results   Component Value Date    WBC 5.59 07/13/2022    HGB 14.7 07/13/2022    HCT 43.4 07/13/2022    MCV 95.4 07/13/2022     07/13/2022     No results found for: INR, PROTIME  Lab Results   Component Value Date    TSH 6.120 (H) 09/26/2019       Cardiac Testing:     I personally viewed and interpreted the patient's EKG/Telemetry/lab data    Procedures    Tobacco " Cessation: N/A  Obstructive Sleep Apnea Screening: Completed    Advance Care Planning   ACP discussion was declined by the patient. Patient does not have an advance directive, declines further assistance.       Assessment & Plan    Diagnoses and all orders for this visit:    1. Bradycardia, sinus (Primary)    2. Vasovagal syncope    3. VT (ventricular tachycardia)    4. At risk for obstructive sleep apnea    5. Syncope and collapse         Diagnosis Plan   1. Bradycardia, sinus   normal variant.  Not pathology.      2. Vasovagal syncope   unrelated to incidental finding of VT see below.      3. VT (ventricular tachycardia)   structurally normal heart.  Extensive work-up done.    Not related to his episode of syncope.    May be a marker for occult sleep apnea    Does snore.    Feels fatigued sometimes..    Sleep Apnea Screening Questionnaire - ‘STOP BANG’       Question Yes No  1.   Do you Snore Loudly (loud enough to be heard through closed doors or your bed-partner elbows you for snoring at night)?  x    2.  Do you often fell Tired, Fatigued, or Sleepy during the daytime (such as        falling asleep during driving or talking to someone)?  x    3.  Has anyone Observed you Stop Breathing or Choking/Gasping during your        sleep?   x   4.  Do you have or are being treated for High Blood Pressure?      5.  Body Mass Index more than 35 kg/m2       Height:                    Weight:       03/27/23  1056   Weight: 80.3 kg (177 lb)          BMI: Body mass index is 24.01 kg/m². x    6.  Age older than 50?  x   7.  Neck size larger? (Measured around Rolle apple)       For Male: Is your shirt collar 17 inches / 43 cm or larger?        For Female: Is your shirt collar 17 inches / 43 cm or larger?      8.  Gender =    [x] Male   [] Female                   For general population   MICHELA - Low Risk : Yes to 0 - 2 questions  MICHELA - Intermediate Risk : Yes to 3 - 4 questions  MICHELA - High Risk : Yes to 5 - 8 questions  or Yes to 2  or more of 4 STOP questions + male gender  or Yes to 2 or more of 4 STOP questions + BMI > 35kg/m2  or Yes to 2 or more of 4 STOP questions + neck circumference 17 inches / 43cm in male or 16 inches / 41cm in female     Modified from  Pepito ANDRES et al. Anesthesiology 2008; 108: 812-821,   Pepito ANDRES et al Br J Anaesth 2012; 108: 768-775,   Pepito ANDRES et al J Clin Sleep Med Sept 2014.     We will see if we can arrange for a home sleep study.        4. At risk for obstructive sleep apnea   as above.      5. Syncope and collapse   vasovagal.   Follow-up with me in 12 to 18 months.     Body mass index is 24.01 kg/m².    I spent 38 minutes in consultation with this patient which included more than 65% of this time in direct face-to-face counseling, physical examination and discussion of my assessment and findings and this shared decision making with the patient.  The remainder of the time not spent face-to-face was performing one, some or all of the following actions: preparing to see the patient (e.g. reviewing tests, prior clinicians' notes, etc), ordering medications, tests or procedures, coordination of care, discussion of the plan with other healthcare providers, documenting clinical information in epic as well as independently interpreting results and communication of these results to the patient family and/or caregiver(s).  Please note that this explicitly excludes time spent on other separate billable services such as performing procedures or test interpretation, when applicable.      Follow Up:       Thank you for allowing me to participate in the care of your patient. Please to not hesitate to contact me with additional questions or concerns.      Rafael Medina, DO, FACC, RS  Cardiac Electrophysiologist  [facility] / [facility]

## 2023-05-04 PROCEDURE — G2066 INTER DEVC REMOTE 30D: HCPCS | Performed by: INTERNAL MEDICINE

## 2023-05-04 PROCEDURE — 93298 REM INTERROG DEV EVAL SCRMS: CPT | Performed by: INTERNAL MEDICINE

## 2023-06-07 ENCOUNTER — OFFICE VISIT (OUTPATIENT)
Dept: SLEEP MEDICINE | Facility: HOSPITAL | Age: 73
End: 2023-06-07
Payer: MEDICARE

## 2023-06-07 VITALS
OXYGEN SATURATION: 97 % | HEART RATE: 70 BPM | HEIGHT: 72 IN | WEIGHT: 179.2 LBS | SYSTOLIC BLOOD PRESSURE: 127 MMHG | DIASTOLIC BLOOD PRESSURE: 63 MMHG | BODY MASS INDEX: 24.27 KG/M2

## 2023-06-07 DIAGNOSIS — G47.33 OBSTRUCTIVE SLEEP APNEA, ADULT: ICD-10-CM

## 2023-06-07 DIAGNOSIS — R06.83 SNORING: Primary | ICD-10-CM

## 2023-06-07 PROBLEM — M72.0 PALMAR FASCIAL FIBROMATOSIS (DUPUYTREN): Status: ACTIVE | Noted: 2022-12-27

## 2023-06-07 NOTE — PROGRESS NOTES
Marlon Bergman is a 72 y.o. male.   Chief Complaint   Patient presents with    Sleeping Problem       HPI     72 y.o. male seen in consultation at the request of Rafael Medina DO for evaluation of the above.     He had an episode of syncope.  He has had an extensive cardiac evaluation which has revealed a lack of ischemia and normal cardiac morphology.  Apparently he did have 1 episode of ventricular tachycardia documented and he has a long-term monitor in place.  Dr. Medina did not feel as though the syncope was related to the ventricular tachycardia.    He has been observed to snore by his wife.  She says this is typically not loud and it is intermittent.  He denies any daytime somnolence.    He typically awakens 2 or 3 times per night.  He will initiate sleep within about 10 minutes.  He thinks he averages around 8-1/2 hours of sleep per night.  He does not nap during the day.  He does not awaken with a dry mouth or sore throat.  He has no limb movement symptoms.  He has no significant parasomnias.    Brinkley Scale is: 0/24    The patient's relevant past medical, surgical, family, and social history reviewed and updated in Epic as appropriate.    Current medications are:   Current Outpatient Medications:     fluticasone (FLONASE) 50 MCG/ACT nasal spray, 2 sprays into the nostril(s) as directed by provider Daily As Needed for Rhinitis., Disp: , Rfl:     levocetirizine (XYZAL) 5 MG tablet, Take 1 tablet by mouth Every Evening., Disp: , Rfl:     Multiple Vitamins-Minerals (MULTIVITAL PO), Take 1 tablet by mouth Daily., Disp: , Rfl:     pantoprazole (PROTONIX) 40 MG EC tablet, Take 1 tablet by mouth Daily., Disp: , Rfl:     simvastatin (ZOCOR) 10 MG tablet, Take 1 tablet by mouth Every Night., Disp: , Rfl:     SUMAtriptan (IMITREX) 100 MG tablet, Take one tablet at onset of headache. May repeat dose one time in 2 hours if headache not relieved. (Patient taking differently: Take one tablet at onset of headache. May  "repeat dose one time in 2 hours if headache not relieved. Patient states he uses about once every 6 months.), Disp: 12 tablet, Rfl: 2    vitamin C (ASCORBIC ACID) 250 MG tablet, Take 1 tablet by mouth Daily., Disp: , Rfl:     Vitamin D, Cholecalciferol, (CHOLECALCIFEROL) 400 units tablet, Take 1 tablet by mouth Daily., Disp: , Rfl: .    Review of Systems    Review of Systems  ROS documented in patient questionnaire ×14 systems.  Reviewed with patient.  Otherwise negative except as noted in HPI.    Physical Exam    Blood pressure 127/63, pulse 70, height 182.9 cm (72\"), weight 81.3 kg (179 lb 3.2 oz), SpO2 97 %. Body mass index is 24.3 kg/m².    Physical Exam  Vitals and nursing note reviewed.   Constitutional:       Appearance: Normal appearance. He is well-developed.   HENT:      Head: Normocephalic and atraumatic.      Nose: Nose normal.      Mouth/Throat:      Mouth: Mucous membranes are moist.      Pharynx: Oropharynx is clear. No oropharyngeal exudate.      Comments: Class II airway  Eyes:      General: No scleral icterus.     Conjunctiva/sclera: Conjunctivae normal.   Neck:      Thyroid: No thyromegaly.      Trachea: No tracheal deviation.   Cardiovascular:      Rate and Rhythm: Normal rate and regular rhythm.      Heart sounds: No murmur heard.    No friction rub. No gallop.   Pulmonary:      Effort: Pulmonary effort is normal. No respiratory distress.      Breath sounds: No wheezing or rales.   Musculoskeletal:         General: No deformity. Normal range of motion.   Skin:     General: Skin is warm and dry.      Findings: No rash.   Neurological:      Mental Status: He is alert and oriented to person, place, and time.   Psychiatric:         Behavior: Behavior normal.         Thought Content: Thought content normal.     DATA:    Reviewed 3/27/2023 note from Dr. Medina    Reviewed 7/7/2022 cardiac catheterization    ASSESSMENT:    Problem List Items Addressed This Visit    None  Visit Diagnoses       Snoring    " -  Primary    Relevant Orders    Home Sleep Study    Obstructive sleep apnea, adult        Relevant Orders    Home Sleep Study            72-year-old male referred by cardiology for evaluation of the possibility of obstructive sleep apnea.  He has been observed to snore by his wife.  Based upon this, his age, and his gender he is at significant risk for the presence of obstructive sleep apnea.  I discussed this with him and recommended further evaluation.  He was agreeable to proceed as noted below.    PLAN:    Home sleep apnea testing is an appropriate initial diagnostic step in his case.  If his home study is negative I do not think that I would pursue a PSG given his risk profile.  I discussed the diagnostic process as well as treatment options for obstructive sleep apnea if that is diagnosed.  I went over the long-term cardiovascular and metabolic risks of untreated obstructive sleep apnea.  The patient was amenable to a trial of CPAP therapy if deemed appropriate after testing complete.  Close sleep center follow-up.      I have reviewed the results of my evaluation and impression and discussed my recommendations in detail with the patient.    Signed by  Reid Coy MD    June 7, 2023      CC: Baldemar Espino MD Aasbo, Johan D, DO

## 2023-06-12 ENCOUNTER — TELEPHONE (OUTPATIENT)
Dept: CARDIOLOGY | Facility: CLINIC | Age: 73
End: 2023-06-12
Payer: MEDICARE

## 2023-06-12 NOTE — TELEPHONE ENCOUNTER
Called Mr Bergman due to [facility] loop recorder monitor isn't reading.  He will check monitor this weekend when he gets home.

## 2023-07-21 PROCEDURE — G2066 INTER DEVC REMOTE 30D: HCPCS | Performed by: INTERNAL MEDICINE

## 2023-07-21 PROCEDURE — 93298 REM INTERROG DEV EVAL SCRMS: CPT | Performed by: INTERNAL MEDICINE

## 2023-07-27 ENCOUNTER — HOSPITAL ENCOUNTER (OUTPATIENT)
Dept: SLEEP MEDICINE | Facility: HOSPITAL | Age: 73
Discharge: HOME | End: 2023-07-27
Admitting: INTERNAL MEDICINE
Payer: MEDICARE

## 2023-07-27 DIAGNOSIS — R06.83 SNORING: ICD-10-CM

## 2023-07-27 DIAGNOSIS — G47.33 OBSTRUCTIVE SLEEP APNEA, ADULT: ICD-10-CM

## 2023-07-27 PROCEDURE — 95800 SLP STDY UNATTENDED: CPT

## 2023-10-10 ENCOUNTER — TELEPHONE (OUTPATIENT)
Dept: CARDIOLOGY | Facility: CLINIC | Age: 73
End: 2023-10-10
Payer: MEDICARE

## 2023-10-10 NOTE — TELEPHONE ENCOUNTER
Called Mr Bergman due to [email or web address] loop recorder is not reading.  Spoke with him and he said he would check all connections and will call if he needs assistance.

## 2023-12-06 ENCOUNTER — TELEPHONE (OUTPATIENT)
Dept: CARDIOLOGY | Facility: CLINIC | Age: 73
End: 2023-12-06
Payer: MEDICARE

## 2023-12-06 NOTE — TELEPHONE ENCOUNTER
I spoke with Pacheco to let him know his [employer] monitor was not sending transmissions. He stated he would check the monitor and send a transmission.

## 2024-02-14 ENCOUNTER — TELEPHONE (OUTPATIENT)
Dept: CARDIOLOGY | Facility: CLINIC | Age: 74
End: 2024-02-14
Payer: MEDICARE

## 2024-02-15 ENCOUNTER — OFFICE VISIT (OUTPATIENT)
Dept: CARDIOLOGY | Facility: CLINIC | Age: 74
End: 2024-02-15
Payer: MEDICARE

## 2024-02-15 VITALS
WEIGHT: 186 LBS | DIASTOLIC BLOOD PRESSURE: 80 MMHG | BODY MASS INDEX: 25.19 KG/M2 | HEIGHT: 72 IN | HEART RATE: 72 BPM | SYSTOLIC BLOOD PRESSURE: 114 MMHG

## 2024-02-15 DIAGNOSIS — R55 SYNCOPE AND COLLAPSE: ICD-10-CM

## 2024-02-15 DIAGNOSIS — R00.1 BRADYCARDIA, SINUS: ICD-10-CM

## 2024-02-15 DIAGNOSIS — I47.20 VT (VENTRICULAR TACHYCARDIA): ICD-10-CM

## 2024-02-15 DIAGNOSIS — E78.00 HYPERCHOLESTEREMIA: Primary | ICD-10-CM

## 2024-02-15 RX ORDER — MELOXICAM 15 MG/1
15 TABLET ORAL DAILY PRN
COMMUNITY

## 2024-05-03 ENCOUNTER — TELEPHONE (OUTPATIENT)
Dept: CARDIOLOGY | Facility: CLINIC | Age: 74
End: 2024-05-03
Payer: MEDICARE

## 2024-05-03 NOTE — TELEPHONE ENCOUNTER
Called due to disconnected monitor.  Spoke with Mr Bergman and he will check monitor when he gets home to get connection fixed.

## 2024-08-23 ENCOUNTER — TELEPHONE (OUTPATIENT)
Dept: CARDIOLOGY | Facility: CLINIC | Age: 74
End: 2024-08-23
Payer: MEDICARE

## 2024-10-18 ENCOUNTER — TELEPHONE (OUTPATIENT)
Dept: CARDIOLOGY | Facility: CLINIC | Age: 74
End: 2024-10-18
Payer: MEDICARE

## 2024-10-18 NOTE — TELEPHONE ENCOUNTER
Called Mr Bergman due to [employer] loop monitor isn't reading.  He will make sure it is plugged in and reset monitor once he gets home.

## 2024-12-05 ENCOUNTER — TELEPHONE (OUTPATIENT)
Dept: CARDIOLOGY | Facility: CLINIC | Age: 74
End: 2024-12-05
Payer: MEDICARE

## 2024-12-05 NOTE — TELEPHONE ENCOUNTER
Called due to disconnected [employer] home monitor.  Left message to check connections and to call if he needs assistance.

## 2024-12-23 ENCOUNTER — OFFICE VISIT (OUTPATIENT)
Dept: CARDIOLOGY | Facility: CLINIC | Age: 74
End: 2024-12-23
Payer: MEDICARE

## 2024-12-23 VITALS
DIASTOLIC BLOOD PRESSURE: 70 MMHG | WEIGHT: 179.8 LBS | SYSTOLIC BLOOD PRESSURE: 120 MMHG | HEART RATE: 68 BPM | BODY MASS INDEX: 24.35 KG/M2 | HEIGHT: 72 IN

## 2024-12-23 DIAGNOSIS — I47.10 PAROXYSMAL SVT (SUPRAVENTRICULAR TACHYCARDIA): ICD-10-CM

## 2024-12-23 DIAGNOSIS — I47.20 VT (VENTRICULAR TACHYCARDIA): ICD-10-CM

## 2024-12-23 DIAGNOSIS — E78.5 DYSLIPIDEMIA: ICD-10-CM

## 2024-12-23 DIAGNOSIS — R55 VASOVAGAL SYNCOPE: Primary | ICD-10-CM

## 2024-12-23 PROCEDURE — 99214 OFFICE O/P EST MOD 30 MIN: CPT | Performed by: NURSE PRACTITIONER

## 2024-12-23 PROCEDURE — 1159F MED LIST DOCD IN RCRD: CPT | Performed by: NURSE PRACTITIONER

## 2024-12-23 PROCEDURE — 1160F RVW MEDS BY RX/DR IN RCRD: CPT | Performed by: NURSE PRACTITIONER

## 2024-12-23 NOTE — PROGRESS NOTES
Chief Complaint   Patient presents with    Follow-up     Cardiac management    Lab     Last labs he thinks in April per PCP.    Device check     Last remote Loop recorder check 12/5/24, monitor by Radha.    Med Refill     PCP writes refills on medications.     Brandon Bergman is a 74 y.o. male with mild hyperlipidemia referred for evaluation of syncope, 2 episodes, one in 2019 and again in 2020. Both episodes occurred while sitting. Initially referred to another cardiologist. Holter showed sinus with few PVCs, no VT, no pause. MUGA scan showed normal EF.  Treadmill stress test showed no EKG changes with exercise.      He had a similar episode of near syncope on 6/15/2022 which occurred while driving.  He was seen in [facility] ED describing similar prodrome.  He was driving, felt his heart flutter with mild pressure and again felt he would pass out but he did not.  EKG NSR, CBC, CMP, mag and troponin normal. CTA head and neck showed no carotid or vertebral stenosis.  30-day MCOT placed on 6/16/2022, Multiple runs of VT noted. Toprol XL 25 mg started. Referral placed to Dr. Medina.  Nuclear stress showed small apical ischemia. Amlodipine 2.5 mg added for HTN response. Cardiac cath at [facility] showed no significant lesions. Toprol and amlodipine stopped, did not tolerate due to hypotension. He was referred to Dr. Medina who placed Loop recorder. He felt episodes may be vasovagal.      He returns today for follow up.  He feels well.  Denies chest pain, shortness of breath, palpitations.  No recurrent syncope.  Loop recorder remote checks have shown no significant arrhythmia.  He had 2 brief SVT in February and in March 2024.  No pauses, no VT noted.         Cardiac History:    Past Surgical History:   Procedure Laterality Date    CARDIAC CATHETERIZATION Left 07/13/2022    - No sig lesions    CARDIOVASCULAR STRESS TEST  07/06/2022    7 Min.30 Sec.10.1 METS. 90% THR. 171/88. EF 65%. Small apical Ischemia     CONVERTED (HISTORICAL) HOLTER  06/16/2022    MCOT (pending) multiple runs of VT, longest 6 seconds    CONVERTED (HISTORICAL) HOLTER  12/02/2020    14-day-53/140, few PVCs, no VT, no pauses    ECHO - CONVERTED  06/27/2022    ([facility]) EF 60%, normal LV, LA, no valvular pathology    ECHO - CONVERTED  09/23/2019    ([facility]) EF 60%, mild MR, RVSP 40 mmHg    ENDOSCOPY N/A 09/26/2019    Procedure: ESOPHAGOGASTRODUODENOSCOPY;  Surgeon: Brunner, Mark I, MD;  Location: [facility] ENDOSCOPY;  Service: Gastroenterology    OTHER SURGICAL HISTORY  12/04/2020    Muga-EF 56%    OTHER SURGICAL HISTORY  10/11/2022    (Dr. Medina) [facility] Loop recorder    TONSILLECTOMY       Current Outpatient Medications   Medication Sig Dispense Refill    fluticasone (FLONASE) 50 MCG/ACT nasal spray Administer 2 sprays into the nostril(s) as directed by provider Daily As Needed for Rhinitis.      levocetirizine (XYZAL) 5 MG tablet Take 1 tablet by mouth Every Evening.      meloxicam (MOBIC) 15 MG tablet Take 1 tablet by mouth Daily As Needed.      Multiple Vitamins-Minerals (MULTIVITAL PO) Take 1 tablet by mouth Daily.      pantoprazole (PROTONIX) 40 MG EC tablet Take 1 tablet by mouth Daily.      simvastatin (ZOCOR) 10 MG tablet Take 1 tablet by mouth Every Night.      SUMAtriptan (IMITREX) 100 MG tablet Take one tablet at onset of headache. May repeat dose one time in 2 hours if headache not relieved. (Patient taking differently: Take one tablet at onset of headache. May repeat dose one time in 2 hours if headache not relieved. Patient states he uses about once every 6 months.) 12 tablet 2    vitamin C (ASCORBIC ACID) 250 MG tablet Take 1 tablet by mouth Daily.      Vitamin D, Cholecalciferol, (CHOLECALCIFEROL) 400 units tablet Take 1 tablet by mouth Daily.       No current facility-administered medications for this visit.     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     removed 23     Social History     Socioeconomic History    Marital status:    Tobacco Use    Smoking status: Former     Current packs/day: 0.00     Average packs/day: 1 pack/day for 14.5 years (14.5 ttl pk-yrs)     Types: Cigarettes     Start date: 1973     Quit date: 1987     Years since quittin.2     Passive exposure: Past    Smokeless tobacco: Never   Vaping Use    Vaping status: Never Used   Substance and Sexual Activity    Alcohol use: Not Currently    Drug use: Never    Sexual activity: Yes     Partners: Female     Family History   Problem Relation Age of Onset    Alzheimer's disease Mother     Hypertension Mother     Heart disease Father         When he was 88    Alzheimer's disease Father     Hypertension Father     Coronary artery disease Father     Cancer Sister     Dementia Sister     No Known Problems Brother     No Known Problems Maternal Grandmother     Stroke Maternal Grandfather     Diabetes Paternal Grandmother     Cancer Paternal Grandmother     No Known Problems Paternal Grandfather     No Known Problems Daughter     No Known Problems Daughter      Review of Systems   Constitutional: Positive for weight loss (-7). Negative for decreased appetite and malaise/fatigue.   HENT: Negative.     Eyes:  Negative for blurred vision.   Cardiovascular:  Negative for chest pain, dyspnea on exertion, leg swelling, palpitations and syncope.   Respiratory:  Negative for shortness of breath and sleep disturbances due to breathing.    Endocrine: Negative.    Hematologic/Lymphatic: Negative for bleeding problem. Does not bruise/bleed easily.   Skin: Negative.    Musculoskeletal:  Negative for falls and myalgias.   Gastrointestinal:  Negative for abdominal pain, heartburn and melena.   Genitourinary:  Negative for hematuria.   Neurological:  Negative for dizziness and light-headedness.  "  Psychiatric/Behavioral:  Negative for altered mental status.    Allergic/Immunologic: Negative.       Objective     /70 (BP Location: Right arm, Patient Position: Sitting)   Pulse 68   Ht 182.9 cm (72.01\")   Wt 81.6 kg (179 lb 12.8 oz)   BMI 24.38 kg/m²     Vitals and nursing note reviewed.   Constitutional:       General: Not in acute distress.     Appearance: Well-developed. Not diaphoretic.   Eyes:      Pupils: Pupils are equal, round, and reactive to light.   HENT:      Head: Normocephalic.   Pulmonary:      Effort: Pulmonary effort is normal. No respiratory distress.      Breath sounds: Normal breath sounds.   Cardiovascular:      Normal rate. Regular rhythm.   Pulses:     Intact distal pulses.   Edema:     Peripheral edema absent.   Abdominal:      General: Bowel sounds are normal.      Palpations: Abdomen is soft.   Musculoskeletal: Normal range of motion.      Cervical back: Normal range of motion. Skin:     General: Skin is warm and dry.   Neurological:      Mental Status: Alert and oriented to person, place, and time.        Procedures          Problem List Items Addressed This Visit          Cardiac and Vasculature    Vasovagal syncope - Primary    Overview       MUGA, 12/4/2020 ([facility]): EF 56%.   Echocardiogram, 6/27/2022: EF 56-60%. Borderline concentric LVH. Technically poor quality echo.   [facility], 11/3/2020: Monitored 14 days. NSR. PVCs. Controlled rate 88% of the time.   CTA Neck, 6/15/2022: Patent bilateral carotid arteries. No high-grade stenosis. Mild atherosclerotic calcification at proximal left ICA. Left sphenoid sinus disease. Patent major intracranial vasculature without findings of large vessel occlusion.   [facility], 6/16/2022: Monitored 17 days. VT 6.2 sec. SR with PACs/artifact.   Echocardiogram, 6/27/2022: EF 56-60%. Borderline concentric LVH. Technically poor quality echo.   Loop recorder implantation 10-11-22         VT (ventricular tachycardia)    Overview     [facility], 6/16/2022: " Monitored 17 days. VT 6.2 sec. SR with PACs/artifact.   [facility], 7/6/2022: EF 65%. Small area of hypoperfusion involving the apex seen during the stress improved at rest. Small apical ischemia noted. Average exercise tolerance. Mild increase in chronotropic response. Hypertensive BP response. NSST-T changes seen.          Dyslipidemia    Paroxysmal SVT (supraventricular tachycardia)      VT  -Brief  -normal cardiac cath, normal LV function, 7/2022  -Beta-blocker not tolerated   -EP placed Loop, no recurrent VT     SVT  -12 Months Loop recorder reports reviewed  -2 very brief SVT, asymptomatic  -No other arrhythmia     Syncope  -No recurrent episodes  -Felt to be vasovagal    Hyperlipidemia  -LDL 67 on simvastatin.      Cardiac status appears stable.  Follow-up 1 year or sooner if needed.    BMI is within normal parameters. No other follow-up for BMI required.              Electronically signed by GAUTAM Beltrán,  December 23, 2024 10:47 EST

## 2025-02-12 PROCEDURE — 93298 REM INTERROG DEV EVAL SCRMS: CPT | Performed by: INTERNAL MEDICINE

## 2025-04-01 ENCOUNTER — TELEPHONE (OUTPATIENT)
Dept: CARDIOLOGY | Facility: CLINIC | Age: 75
End: 2025-04-01
Payer: MEDICARE

## 2025-04-01 NOTE — TELEPHONE ENCOUNTER
Called Mr Bergman due to [employer] loop monitor isn't reading.  Spoke with him and he will reset it when he gets home.

## 2025-04-02 PROCEDURE — 93298 REM INTERROG DEV EVAL SCRMS: CPT | Performed by: INTERNAL MEDICINE

## 2025-04-22 ENCOUNTER — TELEPHONE (OUTPATIENT)
Dept: CARDIOLOGY | Facility: CLINIC | Age: 75
End: 2025-04-22
Payer: MEDICARE

## 2025-04-22 NOTE — TELEPHONE ENCOUNTER
Called Mr Bergman due to [employer] loop recorder monitor isn't reading.  Left message to check all connections.  Left my name and number if he has further questions.

## 2025-08-21 ENCOUNTER — TELEPHONE (OUTPATIENT)
Dept: CARDIOLOGY | Facility: CLINIC | Age: 75
End: 2025-08-21
Payer: MEDICARE

## 2025-08-28 LAB
MDC_IDC_MSMT_BATTERY_STATUS: NORMAL
MDC_IDC_PG_IMPLANT_DTM: NORMAL
MDC_IDC_PG_MFG: NORMAL
MDC_IDC_PG_MODEL: NORMAL
MDC_IDC_PG_SERIAL: NORMAL
MDC_IDC_PG_TYPE: NORMAL
MDC_IDC_SESS_DTM: NORMAL
MDC_IDC_SESS_TYPE: NORMAL
MDC_IDC_STAT_AT_BURDEN_PERCENT: 0

## (undated) DEVICE — GW INQWIRE FC PTFE STD J/1.5 .035 260

## (undated) DEVICE — SYR LUERLOK 50ML

## (undated) DEVICE — KT BIOGUARD SXN VLV AIR/H20 4PC DISP

## (undated) DEVICE — JELLY,LUBE,STERILE,FLIP TOP,TUBE,2-OZ: Brand: MEDLINE

## (undated) DEVICE — CATH DIAG EXPO .045 FL3.5 5F 100CM

## (undated) DEVICE — SINGLE-USE BIOPSY FORCEPS: Brand: RADIAL JAW 4

## (undated) DEVICE — INTRO ACCSR BLNT TP

## (undated) DEVICE — INTRO SHEATH PRELUDE IDEAL SPRNG COIL 021 6F 23X80CM

## (undated) DEVICE — CONTN GRAD MEAS TRIANG 32OZ BLK

## (undated) DEVICE — SOL IRR H2O BG 1000ML STRL

## (undated) DEVICE — DEV COMP RAD PRELUDESYNC 24CM

## (undated) DEVICE — MODEL AT P65, P/N 701554-001KIT CONTENTS: HAND CONTROLLER, 3-WAY HIGH-PRESSURE STOPCOCK WITH ROTATING END AND PREMIUM HIGH-PRESSURE TUBING: Brand: ANGIOTOUCH® KIT

## (undated) DEVICE — CATH DIAG EXPO M/ PK 5F FL4/FR4 PIG

## (undated) DEVICE — TUBING, SUCTION, 1/4" X 10', STRAIGHT: Brand: MEDLINE

## (undated) DEVICE — PK CATH CARD 10

## (undated) DEVICE — THE BITE BLOCK MAXI, LATEX FREE STRAP IS USED TO PROTECT THE ENDOSCOPE INSERTION TUBE FROM BEING BITTEN BY THE PATIENT.

## (undated) DEVICE — SPNG ENDO BEDSIDE TUB ENZYM

## (undated) DEVICE — MODEL BT2000 P/N 700287-012KIT CONTENTS: MANIFOLD WITH SALINE AND CONTRAST PORTS, SALINE TUBING WITH SPIKE AND HAND SYRINGE, TRANSDUCER: Brand: BT2000 AUTOMATED MANIFOLD KIT

## (undated) DEVICE — HYBRID CO2 TUBING/CAP SET FOR OLYMPUS® SCOPES & CO2 SOURCE: Brand: ERBE